# Patient Record
Sex: MALE | Race: WHITE
[De-identification: names, ages, dates, MRNs, and addresses within clinical notes are randomized per-mention and may not be internally consistent; named-entity substitution may affect disease eponyms.]

---

## 2018-05-19 ENCOUNTER — HOSPITAL ENCOUNTER (EMERGENCY)
Dept: HOSPITAL 56 - MW.ED | Age: 43
Discharge: HOME | End: 2018-05-19
Payer: MEDICAID

## 2018-05-19 DIAGNOSIS — E78.1: ICD-10-CM

## 2018-05-19 DIAGNOSIS — Z79.899: ICD-10-CM

## 2018-05-19 DIAGNOSIS — R10.13: ICD-10-CM

## 2018-05-19 DIAGNOSIS — E78.5: ICD-10-CM

## 2018-05-19 DIAGNOSIS — Z90.49: ICD-10-CM

## 2018-05-19 DIAGNOSIS — E78.00: ICD-10-CM

## 2018-05-19 DIAGNOSIS — R10.12: Primary | ICD-10-CM

## 2018-05-19 LAB
CHLORIDE SERPL-SCNC: 105 MMOL/L (ref 98–107)
SODIUM SERPL-SCNC: 139 MMOL/L (ref 136–148)

## 2018-05-19 PROCEDURE — 80053 COMPREHEN METABOLIC PANEL: CPT

## 2018-05-19 PROCEDURE — 96360 HYDRATION IV INFUSION INIT: CPT

## 2018-05-19 PROCEDURE — 83690 ASSAY OF LIPASE: CPT

## 2018-05-19 PROCEDURE — 80061 LIPID PANEL: CPT

## 2018-05-19 PROCEDURE — 36415 COLL VENOUS BLD VENIPUNCTURE: CPT

## 2018-05-19 PROCEDURE — 82150 ASSAY OF AMYLASE: CPT

## 2018-05-19 PROCEDURE — 99284 EMERGENCY DEPT VISIT MOD MDM: CPT

## 2018-05-19 PROCEDURE — 85025 COMPLETE CBC W/AUTO DIFF WBC: CPT

## 2018-05-19 PROCEDURE — 74177 CT ABD & PELVIS W/CONTRAST: CPT

## 2018-05-21 NOTE — CT
EXAM DATE: 18



PATIENT'S AGE: 42





Patient: BRYANT GOODRICH



Facility: [place], [place]

Patient ID: [id number]

Site Patient ID: [id number].

Site Accession #: [id number]

: 1975

Study: CT Abdomen/Pelvis [id number]/19/2018 11:04:17 AM

Ordering Physician: Cuba Moura



Final Report: 

INDICATION:

Abdominal pain. Past history pancreatitis.



COMPARISON:

None.



TECHNIQUE:

100 mL Isovue-370 IV contrast with portal venous imaging.



FINDINGS:

No significant finding in the visualized lung bases. Liver and spleen are 
normal. Gallbladder is absent presumably surgically. No ductal dilatation. 
Adrenals are normal. No pancreatic inflammation, ductal dilatation or mass. No 
dilatation or inflammation of large or small bowel. Normal retrocecal appendix. 
Abdominal wall is intact. No fluid or air in the peritoneum. Kidneys enhance 
normally. No definite nephrolithiasis. Some early excretion of contrast in the 
collecting system bilaterally. Aorta is non aneurysmal. No pathologic 
adenopathy. No bone finding of significance. 



IMPRESSION:

No source for abdominal pain identified.



Please note that all CT scans at this facility use dose modulation, iterative 
reconstruction, and/or weight-based dosing when appropriate to reduce radiation 
dose to as low as reasonably achievable.



Dictated by Santos Alcantar MD @ May 19 2018 11:31AM

(Electronic Signature)





Report Signed by Proxy.
[facility]

## 2018-08-14 ENCOUNTER — HOSPITAL ENCOUNTER (INPATIENT)
Dept: HOSPITAL 56 - MW.ED | Age: 43
LOS: 3 days | Discharge: HOME | DRG: 440 | End: 2018-08-17
Attending: INTERNAL MEDICINE | Admitting: INTERNAL MEDICINE
Payer: COMMERCIAL

## 2018-08-14 DIAGNOSIS — Z87.891: ICD-10-CM

## 2018-08-14 DIAGNOSIS — K85.90: Primary | ICD-10-CM

## 2018-08-14 DIAGNOSIS — Z79.899: ICD-10-CM

## 2018-08-14 DIAGNOSIS — E78.1: ICD-10-CM

## 2018-08-14 DIAGNOSIS — E78.01: ICD-10-CM

## 2018-08-14 LAB
CHLORIDE SERPL-SCNC: 101 MMOL/L (ref 98–107)
SODIUM SERPL-SCNC: 139 MMOL/L (ref 136–148)

## 2018-08-14 PROCEDURE — G0378 HOSPITAL OBSERVATION PER HR: HCPCS

## 2018-08-14 NOTE — CT
EXAM DATE: 18



PATIENT'S AGE: 43



Patient: BRYANT GOODRICH



Facility: [place], [place]

Patient ID: [id number]

Site Patient ID: [id number].

Site Accession #: [id number]

: 1975

Study: CT Abdomen/Pelvis w/ cont [id number]/14/2018 4:26:13 AM

Ordering Physician: Cuba Moura



Final Report: 

INDICATION:

History of pancreatitis 



TECHNIQUE:

CT abdomen and pelvis acquired with IV contrast. 100 cc Isovue 370 



COMPARISON:

2018. 



FINDINGS:

Lower chest: Unremarkable. 

Liver: Unremarkable. 

Spleen: Unremarkable. 

Pancreas: Unremarkable. 

Gallbladder and bile ducts: The gallbladder is absent. 

Kidneys: Unremarkable. 

Adrenal glands: Unremarkable. 

GI tract: Unremarkable. Appendix is normal. 

Vascular structures: Unremarkable. 

Lymph nodes: Unremarkable. 

Miscellaneous: Stable proximal mesenteric fat stranding. No free air or 
significant free fluid. 

Pelvic Organs: Unremarkable. 

Bones: Unremarkable for age. 



IMPRESSION:

No CT evidence for pancreatitis. No definitive findings to explain patient`s 
symptoms. 

Stable proximal mesenteric fat stranding. 

The gallbladder is absent.



Dictated by Robert Dasilva MD @ 2018 5:03:33 AM



Please note that all CT scans at this facility use dose modulation, iterative 
reconstruction, and/or weight-based dosing when appropriate to reduce radiation 
dose to as low as reasonably achievable.



Dictated by: Robert Dasilva MD @ 2018 05:03:47

(Electronic Signature)







Report Signed by Proxy.
[facility]

## 2018-08-14 NOTE — PCM.HP
H&P History of Present Illness





- General


Date of Service: 08/14/18


Admit Problem/Dx: 


 Admission Diagnosis/Problem





Admission Diagnosis/Problem      Abdominal pain in male











- History of Present Illness


Initial Comments - Free Text/Narative: 





44 yo male with pmh of recurrent triglyceride induced pancreatitis and familial 

hyperchloresterolemia who presents with several day history of epigastric pain.

  He denies any fevers, blood in stool or shortness of breath.


  ** upper abdomen


Pain Score (Numeric/FACES): 4





- Related Data


Allergies/Adverse Reactions: 


 Allergies











Allergy/AdvReac Type Severity Reaction Status Date / Time


 


No Known Allergies Allergy   Verified 08/14/18 00:46











Home Medications: 


 Home Meds





Fish Oil/Omega-3 Fatty Acids [Fish Oil 1,000 MG] 2 gm PO BID #60 cap 08/17/18 [

Rx]


Gemfibrozil [Lopid] 600 mg PO BID 30 Days #60 tablet 08/17/18 [Rx]











Past Medical History


Cardiovascular History: Reports: High Cholesterol


Other Cardiovascular History: high triglycerides


Gastrointestinal History: Reports: Pancreatitis


Other Gastrointestinal History: pancreatitis x9; has a familial hyperlipidemia 

which he states it is linked to the pancreatitis





- Infectious Disease History


Infectious Disease History: Reports: Chicken Pox





- Past Surgical History


GI Surgical History: Reports: Cholecystectomy





Social & Family History





- Family History


Family Medical History: Noncontributory





- Tobacco Use


Smoking Status *Q: Former Smoker


Years of Tobacco use: 15


Used Tobacco, but Quit: Yes


Month/Year Tobacco Last Used: 6 momths ago





- Caffeine Use


Caffeine Use: Reports: Coffee





- Recreational Drug Use


Recreational Drug Use: No





H&P Review of Systems





- Review of Systems:


Review Of Systems: See Below





Exam





- Exam


Exam: See Below





- Vital Signs


Vital Signs: 


 Last Vital Signs











Temp  36.4 C   08/14/18 11:38


 


Pulse  83   08/14/18 11:38


 


Resp  18   08/14/18 11:38


 


BP  127/78   08/14/18 11:38


 


Pulse Ox  97   08/14/18 11:38











Weight: 94.075 kg





- Exam


General: Alert, Oriented


HEENT: Mucosa Moist & Pink


Neck: Supple


Lungs: Clear to Auscultation, Normal Respiratory Effort


Cardiovascular: Regular Rate, Regular Rhythm


GI/Abdominal Exam: Normal Bowel Sounds, Soft, Non-Tender


Skin: Warm, Dry, Intact





- Patient Data


Lab Results Last 24 hrs: 


 Laboratory Results - last 24 hr











  08/14/18 08/14/18 08/14/18 Range/Units





  01:15 01:15 02:00 


 


WBC  6.18    (4.0-11.0)  K/uL


 


RBC  4.33 L    (4.50-5.90)  M/uL


 


Hgb  NP    


 


Hct  37.8 L    (38.0-50.0)  %


 


RDW Std Deviation  44.9    (28.0-62.0)  fl


 


RDW Coeff of Dajuan  14    (11.0-15.0)  %


 


Plt Count  202    (150-400)  K/uL


 


MPV  10.20    (7.40-12.00)  fL


 


Add Manual Diff  YES    


 


Neutrophils % (Manual)  64    (48.0-80.0)  %


 


Band Neutrophils %  1    %


 


Lymphocytes % (Manual)  21    (16.0-40.0)  %


 


Monocytes % (Manual)  9    (0.0-15.0)  %


 


Eosinophils % (Manual)  4    (0.0-7.0)  %


 


Metamyelocytes %  1    %


 


Nucleated RBC %  1.1    /100WBC


 


Absolute Seg Neuts  4.0    (1.4-5.7)  


 


Band Neutrophils #  0.1    


 


Lymphocytes # (Manual)  1.3    (0.6-2.4)  


 


Monocytes # (Manual)  0.6    (0.0-0.8)  


 


Eosinophils # (Manual)  0.2    (0.0-0.7)  


 


Absolute Metamyelocyte  0.1    


 


Nucleated RBCs #  0    K/uL


 


Sodium   139   (136-148)  mmol/L


 


Potassium   4.2   (3.5-5.1)  mmol/L


 


Chloride   101   ()  mmol/L


 


Carbon Dioxide   24.8   (21.0-32.0)  mmol/L


 


BUN   19 H   (7.0-18.0)  mg/dL


 


Creatinine   1.2   (0.8-1.3)  mg/dL


 


Est Cr Clr Drug Dosing   70.15   mL/min


 


Estimated GFR (MDRD)   > 60.0   ml/min


 


Glucose   124 H   ()  mg/dL


 


Calcium   8.9   (8.5-10.1)  mg/dL


 


Total Bilirubin   0.4   (0.2-1.0)  mg/dL


 


Alkaline Phosphatase   66   ()  U/L


 


Total Protein   7.5   (6.4-8.2)  g/dL


 


Albumin   NP   


 


Triglycerides   1515 H   (0-200)  mg/dL


 


Cholesterol   276 H   ()  mg/dL


 


HDL Cholesterol   22 L   (40-60)  mg/dL


 


Cholesterol/HDL Ratio   12.5 H   (3.3-6.0)  


 


Lipase   1697 H   ()  U/L


 


Urine Color    YELLOW  


 


Urine Appearance    CLEAR  


 


Urine pH    6.0  (5.0-8.0)  


 


Ur Specific Gravity    1.025  (1.001-1.035)  


 


Urine Protein    NEGATIVE  (NEGATIVE)  mg/dL


 


Urine Glucose (UA)    NEGATIVE  (NEGATIVE)  mg/dL


 


Urine Ketones    NEGATIVE  (NEGATIVE)  mg/dL


 


Urine Occult Blood    TRACE-INTACT  (NEGATIVE)  


 


Urine Nitrite    NEGATIVE  (NEGATIVE)  


 


Urine Bilirubin    NEGATIVE  (NEGATIVE)  


 


Urine Urobilinogen    0.2  (<2.0)  EU/dL


 


Ur Leukocyte Esterase    NEGATIVE  (NEGATIVE)  


 


Urine RBC    0-1  (0-2/HPF)  


 


Urine WBC    1-3  (0-5/HPF)  


 


Ur Epithelial Cells    NOT SEEN  (NONE-FEW)  


 


Amorphous Sediment    FEW  (NEGATIVE)  


 


Urine Bacteria    RARE  (NEGATIVE)  


 


Urine Mucus    MODERATE  (NONE-MOD)  











Result Diagrams: 


 08/17/18 04:40





 08/17/18 04:40


Problem List Initiated/Reviewed/Updated: Yes


Orders Last 24hrs: 


 Active Orders 24 hr











 Category Date Time Status


 


 Admission Status [Patient Status] [ADT] Stat ADT  08/14/18 05:22 Active


 


 Accu Check [Blood Glucose Check, Bedside] [RC] Q1HR Care  08/14/18 12:38 

Ordered


 


 Oxygen Therapy [RC] PRN Care  08/14/18 12:38 Ordered


 


 Up ad Radha [RC] ASDIRECTED Care  08/14/18 12:38 Ordered


 


 VTE/DVT Education [RC] PER UNIT ROUTINE Care  08/14/18 12:38 Ordered


 


 Vital Signs [RC] Q4H Care  08/14/18 12:38 Ordered


 


 NPO Now [Nothing per Oral Now Diet] [DIET] Diet  08/14/18 Breakfast Active


 


 Abdomen Pelvis w Cont [CT] Stat Exams  08/14/18 01:00 Taken


 


 BASIC METABOLIC PANEL,BMP [CHEM] AM Lab  08/15/18 05:11 Ordered


 


 CBC WITH AUTO DIFF [HEME] AM Lab  08/15/18 05:11 Ordered


 


 UA W/MICROSCOPIC [URIN] Stat Lab  08/14/18 02:00 Ordered


 


 Dextrose 5%-Normal Saline @ 150 MLS/HR(1000ml) Med  08/14/18 12:45 Ordered





 Dextrose 5%-0.9% NaCl [Dextrose 5%-Normal Saline] 1,000   





 ml   





 IV ASDIRECTED   


 


 HYDROmorphone [Dilaudid] Med  08/14/18 06:32 Active





 1 mg IVPUSH Q2H PRN   


 


 Insulin Regular, Human [NovoLIN R] 100 unit Med  08/14/18 12:45 Ordered





 Sodium Chloride 0.9% [Normal Saline] 99 ml   





 IV TITRATE   


 


 Ondansetron [Zofran] Med  08/14/18 06:32 Active





 4 mg IVPUSH Q4H PRN   


 


 Sodium Chloride 0.9% [Saline Flush] Med  08/14/18 01:00 Active





 10 ml FLUSH ASDIRECTED PRN   


 


 Sodium Chloride 0.9% [Saline Flush] Med  08/14/18 01:00 Active





 2.5 ml FLUSH ASDIRECTED PRN   


 


 Sodium Chloride 0.9% [Saline Flush] Med  08/14/18 01:00 Active





 2.5 ml FLUSH ASDIRECTED PRN   


 


 Saline Lock Insert [OM.PC] Stat Oth  08/14/18 01:00 Ordered


 


 Sequential Compression Device [OM.PC] Per Unit Routine Oth  08/14/18 12:39 

Ordered


 


 Resuscitation Status Routine Resus Stat  08/14/18 12:38 Ordered








 Medication Orders





Hydromorphone HCl (Dilaudid)  1 mg IVPUSH Q2H PRN


   PRN Reason: Pain


   Last Admin: 08/14/18 11:21  Dose: 1 mg


   Admin: 08/14/18 06:57  Dose: 1 mg


Insulin Human Regular 100 unit (/ Sodium Chloride)  100 mls @ 1 mls/hr IV 

TITRATE DAVIE; Protocol


Dextrose/Sodium Chloride (Dextrose 5%-Normal Saline)  1,000 mls @ 150 mls/hr IV 

ASDIRECTED DAVIE


Ondansetron HCl (Zofran)  4 mg IVPUSH Q4H PRN


   PRN Reason: Nausea/Vomiting


Sodium Chloride (Saline Flush)  2.5 ml FLUSH ASDIRECTED PRN


   PRN Reason: Keep Vein Open


   Last Admin: 08/14/18 01:16  Dose: 2.5 ml


Sodium Chloride (Saline Flush)  10 ml FLUSH ASDIRECTED PRN


   PRN Reason: Keep Vein Open


   Last Admin: 08/14/18 01:15  Dose: 10 ml


Sodium Chloride (Saline Flush)  2.5 ml FLUSH ASDIRECTED PRN


   PRN Reason: Keep Vein Open








Assessment/Plan Comment:: 





44 yo male with hypertriglyeridemia induced acute pancreatitis.  We will 

rescucitate with IV fluids, dialaudid prn for pain control and bowel rest.  We 

will try insulin drip to reduce his triglyceride levels.

## 2018-08-14 NOTE — EDM.PDOC
ED HPI GENERAL MEDICAL PROBLEM





- General


Chief Complaint: Abdominal Pain


Stated Complaint: UPPER STOMACH PAIN


Time Seen by Provider: 08/14/18 00:28


Source of Information: Reports: Patient


History Limitations: Reports: No Limitations





- History of Present Illness


INITIAL COMMENTS - FREE TEXT/NARRATIVE: 





HISTORY AND PHYSICAL:





History of present illness:


43-year-old male presenting emergency department with mid epigastric abdominal 

pain 1 day with past medical history of familial hypercholesterolemia and 

chronic pancreatitis.





I have seen this patient before and discussed with him about his condition 3 

months ago. He has not seen a provider since I saw him 3 months ago for similar 

symptoms. States that this evening he after dinner approximately 2 hours began 

having mid abdominal pain that was similar to his chronic pancreatitis and that 

he had before. States he tried to lay down but it seemed to be worse. Came to 

the emergency department for further evaluation. Pain is constant and sharp 

with radiation to the back. Denies any fever, chills, nausea, vomiting, diarrhea

, bloody stool, dark tarry stools, chest pain, palpitations, shortness of breath

, syncopal episodes, focal neurologic episodes. History of familial HLD.  Last 

CT 3 months ago was negative but Triglycerides elevated. He is not consuming 

alcohol.








On exam mid and epigastric abd pain on palp.  No rebound or rigidity, normal bs

, 





Review of systems: 


As per history of present illness and below otherwise all systems reviewed and 

negative.





Past medical history: 


As per history of present illness and as reviewed below otherwise 

noncontributory.





Surgical history: 


As per history of present illness and as reviewed below otherwise 

noncontributory.





Social history: 


No reported history of drug or alcohol abuse.





Family history: 


As per history of present illness and as reviewed below otherwise 

noncontributory.





Physical exam:


HEENT: Atraumatic, normocephalic, pupils reactive, negative for conjunctival 

pallor or scleral icterus, mucous membranes moist, throat clear, neck supple, 

nontender, trachea midline.


Lungs: Clear to auscultation, breath sounds equal bilaterally, chest nontender.


Heart: S1S2, regular, negative for clicks, rubs, or JVD.


Abdomen: See above nondistended, . Negative for masses or hepatosplenomegaly. 

Negative for costovertebral tenderness.


Pelvis: Stable nontender.


Genitourinary: Deferred.


Rectal: Deferred.


Extremities: Atraumatic, negative for cords or calf pain. Neurovascular 

unremarkable.


Neuro: Awake, alert, oriented. Cranial nerves II through XII unremarkable. 

Cerebellum unremarkable. Motor and sensory unremarkable throughout. Exam 

nonfocal.





Diagnostics:


CBC, CMP, lipid panel, UA, abdominal CT with contrast





Therapeutics:


1 L normal saline, 30 mg IV Toradol, 1 mg IV Dilaudid





Impression: 


Familial hypercholesterolemia


Chylomicronemia syndrome


Chronic pancreatitis





Plan:


Did talk with Dr. Brothers, hospitalist that agreed with admission.  Patient TGL 

was 1515 with HDL of 22, Chol 276, and LIPL 1697, CT abdomin unremarkable.  

Most likely Chylomicronemia syndrome with hx of Familial hypercholesterolemia.  








Definitive disposition and diagnosis as appropriate pending reevaluation and 

review of above.








  ** upper abdomen


Pain Score (Numeric/FACES): 10





- Related Data


 Allergies











Allergy/AdvReac Type Severity Reaction Status Date / Time


 


No Known Allergies Allergy   Verified 08/14/18 00:46











Home Meds: 


 Home Meds





Gemfibrozil [Lopid] 600 mg PO BID 05/19/18 [History]











Past Medical History


Cardiovascular History: Reports: High Cholesterol


Other Cardiovascular History: high triglycerides


Gastrointestinal History: Reports: Pancreatitis


Other Gastrointestinal History: pancreatitis x9; has a familial hyperlipidemia 

which he states it is linked to the pancreatitis





- Infectious Disease History


Infectious Disease History: Reports: Chicken Pox





- Past Surgical History


GI Surgical History: Reports: Cholecystectomy





Social & Family History





- Family History


Family Medical History: Noncontributory





- Caffeine Use


Caffeine Use: Reports: Coffee





ED ROS GENERAL





- Review of Systems


Review Of Systems: ROS reveals no pertinent complaints other than HPI.





ED EXAM, GENERAL





- Physical Exam


Exam: See Below





Course





- Vital Signs


Last Recorded V/S: 


 Last Vital Signs











Temp  97.8 F   08/14/18 00:20


 


Pulse  99   08/14/18 00:20


 


Resp  18   08/14/18 00:20


 


BP  159/93 H  08/14/18 00:20


 


Pulse Ox  94 L  08/14/18 00:20














- Orders/Labs/Meds


Orders: 


 Active Orders 24 hr











 Category Date Time Status


 


 Abdomen Pelvis w Cont [CT] Stat Exams  08/14/18 01:00 Taken


 


 CBC WITH AUTO DIFF [HEME] Stat Lab  08/14/18 01:15 Received


 


 COMPREHENSIVE METABOLIC PN,CMP [CHEM] Stat Lab  08/14/18 01:15 Received


 


 LIPASE [CHEM] Stat Lab  08/14/18 01:15 Received


 


 LIPID PANEL [CHEM] Stat Lab  08/14/18 01:15 Received


 


 UA W/MICROSCOPIC [URIN] Stat Lab  08/14/18 02:00 Ordered


 


 Sodium Chloride 0.9% [Normal Saline] 1,000 ml Med  08/14/18 04:44 Active





 IV STAT   


 


 Sodium Chloride 0.9% [Saline Flush] Med  08/14/18 01:00 Active





 10 ml FLUSH ASDIRECTED PRN   


 


 Sodium Chloride 0.9% [Saline Flush] Med  08/14/18 01:00 Active





 2.5 ml FLUSH ASDIRECTED PRN   


 


 Sodium Chloride 0.9% [Saline Flush] Med  08/14/18 01:00 Active





 2.5 ml FLUSH ASDIRECTED PRN   


 


 Saline Lock Insert [OM.PC] Stat Oth  08/14/18 01:00 Ordered








 Medication Orders





Sodium Chloride (Normal Saline)  1,000 mls @ 999 mls/hr IV STAT ONE


   Stop: 08/14/18 05:44


   Last Admin: 08/14/18 03:00  Dose: 999 mls/hr


Sodium Chloride (Saline Flush)  2.5 ml FLUSH ASDIRECTED PRN


   PRN Reason: Keep Vein Open


   Last Admin: 08/14/18 01:16  Dose: 2.5 ml


Sodium Chloride (Saline Flush)  10 ml FLUSH ASDIRECTED PRN


   PRN Reason: Keep Vein Open


   Last Admin: 08/14/18 01:15  Dose: 10 ml


Sodium Chloride (Saline Flush)  2.5 ml FLUSH ASDIRECTED PRN


   PRN Reason: Keep Vein Open








Labs: 


 Laboratory Tests











  08/14/18 Range/Units





  02:00 


 


Urine Color  YELLOW  


 


Urine Appearance  CLEAR  


 


Urine pH  6.0  (5.0-8.0)  


 


Ur Specific Gravity  1.025  (1.001-1.035)  


 


Urine Protein  NEGATIVE  (NEGATIVE)  mg/dL


 


Urine Glucose (UA)  NEGATIVE  (NEGATIVE)  mg/dL


 


Urine Ketones  NEGATIVE  (NEGATIVE)  mg/dL


 


Urine Occult Blood  TRACE-INTACT  (NEGATIVE)  


 


Urine Nitrite  NEGATIVE  (NEGATIVE)  


 


Urine Bilirubin  NEGATIVE  (NEGATIVE)  


 


Urine Urobilinogen  0.2  (<2.0)  EU/dL


 


Ur Leukocyte Esterase  NEGATIVE  (NEGATIVE)  


 


Urine RBC  0-1  (0-2/HPF)  


 


Urine WBC  1-3  (0-5/HPF)  


 


Ur Epithelial Cells  NOT SEEN  (NONE-FEW)  


 


Amorphous Sediment  FEW  (NEGATIVE)  


 


Urine Bacteria  RARE  (NEGATIVE)  


 


Urine Mucus  MODERATE  (NONE-MOD)  











Meds: 


Medications











Generic Name Dose Route Start Last Admin





  Trade Name Freq  PRN Reason Stop Dose Admin


 


Sodium Chloride  1,000 mls @ 999 mls/hr  08/14/18 04:44  08/14/18 03:00





  Normal Saline  IV  08/14/18 05:44  999 mls/hr





  STAT ONE   Administration





     





     





     





     


 


Sodium Chloride  2.5 ml  08/14/18 01:00  08/14/18 01:16





  Saline Flush  FLUSH   2.5 ml





  ASDIRECTED PRN   Administration





  Keep Vein Open   





     





     





     


 


Sodium Chloride  10 ml  08/14/18 01:00  08/14/18 01:15





  Saline Flush  FLUSH   10 ml





  ASDIRECTED PRN   Administration





  Keep Vein Open   





     





     





     


 


Sodium Chloride  2.5 ml  08/14/18 01:00  





  Saline Flush  FLUSH   





  ASDIRECTED PRN   





  Keep Vein Open   





     





     





     














Discontinued Medications














Generic Name Dose Route Start Last Admin





  Trade Name Freq  PRN Reason Stop Dose Admin


 


Hydromorphone HCl  1 mg  08/14/18 01:45  08/14/18 02:00





  Dilaudid  IVPUSH  08/14/18 01:46  1 mg





  ONETIME ONE   Administration





     





     





     





     


 


Hydromorphone HCl  Confirm  08/14/18 04:13  08/14/18 04:45





  Dilaudid  Administered  08/14/18 04:14  Not Given





  Dose   





  1 mg   





  .ROUTE   





  .STK-MED ONE   





     





     





     





     


 


Hydromorphone HCl  1 mg  08/14/18 04:44  08/14/18 04:14





  Dilaudid  IVPUSH  08/14/18 04:45  1 mg





  ONETIME ONE   Administration





     





     





     





     


 


Sodium Chloride  1,000 mls @ 999 mls/hr  08/14/18 01:00  08/14/18 01:31





  Normal Saline  IV  08/14/18 02:00  999 mls/hr





  BOLUS ONE   Administration





     





     





     





     


 


Iopamidol  100 ml  08/14/18 04:41  08/14/18 04:43





  Isovue Multipack-370 (76%)  IVPUSH  08/14/18 04:42  100 ml





  ONETIME ONE   Administration





     





     





     





     


 


Ketorolac Tromethamine  30 mg  08/14/18 01:00  08/14/18 01:35





  Toradol  IVPUSH  08/14/18 01:01  30 mg





  ONETIME ONE   Administration





     





     





     





     


 


Ondansetron HCl  4 mg  08/14/18 01:00  08/14/18 01:33





  Zofran  IVPUSH  08/14/18 01:01  4 mg





  ONETIME ONE   Administration





     





     





     





     














Departure





- Departure


Time of Disposition: 05:21


Disposition: Admitted As Inpatient 64


Condition: Fair


Clinical Impression: 


 Abdominal pain in male, Chylomicronemia syndrome, Familial hypercholesterolemia








- Discharge Information


Referrals: 


PCP,None [Primary Care Provider] - 


Forms:  ED Department Discharge





- My Orders


Last 24 Hours: 


My Active Orders





08/14/18 01:00


Abdomen Pelvis w Cont [CT] Stat 


Sodium Chloride 0.9% [Saline Flush]   10 ml FLUSH ASDIRECTED PRN 


Sodium Chloride 0.9% [Saline Flush]   2.5 ml FLUSH ASDIRECTED PRN 


Sodium Chloride 0.9% [Saline Flush]   2.5 ml FLUSH ASDIRECTED PRN 


Saline Lock Insert [OM.PC] Stat 





08/14/18 01:15


CBC WITH AUTO DIFF [HEME] Stat 


COMPREHENSIVE METABOLIC PN,CMP [CHEM] Stat 


LIPASE [CHEM] Stat 


LIPID PANEL [CHEM] Stat 





08/14/18 02:00


UA W/MICROSCOPIC [URIN] Stat 





08/14/18 04:44


Sodium Chloride 0.9% [Normal Saline] 1,000 ml IV STAT 














- Assessment/Plan


Last 24 Hours: 


My Active Orders





08/14/18 01:00


Abdomen Pelvis w Cont [CT] Stat 


Sodium Chloride 0.9% [Saline Flush]   10 ml FLUSH ASDIRECTED PRN 


Sodium Chloride 0.9% [Saline Flush]   2.5 ml FLUSH ASDIRECTED PRN 


Sodium Chloride 0.9% [Saline Flush]   2.5 ml FLUSH ASDIRECTED PRN 


Saline Lock Insert [OM.PC] Stat 





08/14/18 01:15


CBC WITH AUTO DIFF [HEME] Stat 


COMPREHENSIVE METABOLIC PN,CMP [CHEM] Stat 


LIPASE [CHEM] Stat 


LIPID PANEL [CHEM] Stat 





08/14/18 02:00


UA W/MICROSCOPIC [URIN] Stat 





08/14/18 04:44


Sodium Chloride 0.9% [Normal Saline] 1,000 ml IV STAT

## 2018-08-15 LAB
CHLORIDE SERPL-SCNC: 103 MMOL/L (ref 98–107)
SODIUM SERPL-SCNC: 136 MMOL/L (ref 136–148)

## 2018-08-16 LAB
CHLORIDE SERPL-SCNC: 102 MMOL/L (ref 98–107)
SODIUM SERPL-SCNC: 137 MMOL/L (ref 136–148)

## 2018-08-16 RX ADMIN — OMEGA-3 FATTY ACIDS CAP 1000 MG SCH GM: 1000 CAP at 21:39

## 2018-08-16 RX ADMIN — OMEGA-3 FATTY ACIDS CAP 1000 MG SCH GM: 1000 CAP at 10:30

## 2018-08-16 NOTE — PCM.PN
- General Info


Date of Service: 18


Admission Dx/Problem (Free Text): 


 Patient reports improved abdominal pain . His  was advanced as tolerated. 

TG in the morning in 2700 . Patient restarted  on insulin drip with D5 Ns iv 

and he was also restarted on Gemfibrozil and fish oil








- Review of Systems


General: Reports: No Symptoms


HEENT: Reports: No Symptoms


Pulmonary: Reports: No Symptoms


Cardiovascular: Reports: No Symptoms


Gastrointestinal: Reports: Abdominal Pain


Genitourinary: Reports: No Symptoms


Musculoskeletal: Reports: No Symptoms


Skin: Reports: Other (xantelasma)


Neurological: Reports: No Symptoms


Psychiatric: Reports: No Symptoms





- Patient Data


Vitals - Most Recent: 


 Last Vital Signs











Temp  97.3 F   18 16:00


 


Pulse  78   18 16:00


 


Resp  22 H  18 16:00


 


BP  125/62   18 16:00


 


Pulse Ox  96   18 16:00











Weight - Most Recent: 207 lb 6.4 oz


I&O - Last 24 Hours: 


 Intake & Output











 18





 06:59 14:59 22:59


 


Intake Total 1630 1315 3110


 


Output Total 750  2135


 


Balance 880 1315 975











Lab Results Last 24 Hours: 


 Laboratory Results - last 24 hr











  18 Range/Units





  05:55 05:55 05:55 


 


WBC  3.40 L    (4.0-11.0)  K/uL


 


RBC  4.13 L    (4.50-5.90)  M/uL


 


Hgb  13.2    (13.0-17.0)  g/dL


 


Hct  36.2 L    (38.0-50.0)  %


 


MCV  87.7    (80.0-98.0)  fL


 


MCH  32.0    (27.0-32.0)  pg


 


MCHC  36.5    (31.0-37.0)  g/dL


 


RDW Std Deviation  44.7    (28.0-62.0)  fl


 


RDW Coeff of Dajuan  14    (11.0-15.0)  %


 


Plt Count  144 L    (150-400)  K/uL


 


MPV  10.20    (7.40-12.00)  fL


 


Neut % (Auto)  50.9    (48.0-80.0)  %


 


Lymph % (Auto)  34.7    (16.0-40.0)  %


 


Mono % (Auto)  7.6    (0.0-15.0)  %


 


Eos % (Auto)  6.5    (0.0-7.0)  %


 


Baso % (Auto)  0.3    (0.0-1.5)  %


 


Neut # (Auto)  1.7    (1.4-5.7)  K/uL


 


Lymph # (Auto)  1.2    (0.6-2.4)  K/uL


 


Mono # (Auto)  0.3    (0.0-0.8)  K/uL


 


Eos # (Auto)  0.2    (0.0-0.7)  K/uL


 


Baso # (Auto)  0.0    (0.0-0.1)  K/uL


 


Nucleated RBC %  0.0    /100WBC


 


Nucleated RBCs #  0    K/uL


 


Sodium   137   (136-148)  mmol/L


 


Potassium   3.9   (3.5-5.1)  mmol/L


 


Chloride   102   ()  mmol/L


 


Carbon Dioxide   27.9   (21.0-32.0)  mmol/L


 


BUN   6 L   (7.0-18.0)  mg/dL


 


Creatinine   0.9   (0.8-1.3)  mg/dL


 


Est Cr Clr Drug Dosing   93.53   mL/min


 


Estimated GFR (MDRD)   > 60.0   ml/min


 


Glucose   111 H   ()  mg/dL


 


POC Glucose     ()  mg/dL


 


Calcium   7.8 L   (8.5-10.1)  mg/dL


 


Triglycerides    2755 H  (0-200)  mg/dL














  18 Range/Units





  11:26 12:54 14:03 


 


WBC     (4.0-11.0)  K/uL


 


RBC     (4.50-5.90)  M/uL


 


Hgb     (13.0-17.0)  g/dL


 


Hct     (38.0-50.0)  %


 


MCV     (80.0-98.0)  fL


 


MCH     (27.0-32.0)  pg


 


MCHC     (31.0-37.0)  g/dL


 


RDW Std Deviation     (28.0-62.0)  fl


 


RDW Coeff of Dajuan     (11.0-15.0)  %


 


Plt Count     (150-400)  K/uL


 


MPV     (7.40-12.00)  fL


 


Neut % (Auto)     (48.0-80.0)  %


 


Lymph % (Auto)     (16.0-40.0)  %


 


Mono % (Auto)     (0.0-15.0)  %


 


Eos % (Auto)     (0.0-7.0)  %


 


Baso % (Auto)     (0.0-1.5)  %


 


Neut # (Auto)     (1.4-5.7)  K/uL


 


Lymph # (Auto)     (0.6-2.4)  K/uL


 


Mono # (Auto)     (0.0-0.8)  K/uL


 


Eos # (Auto)     (0.0-0.7)  K/uL


 


Baso # (Auto)     (0.0-0.1)  K/uL


 


Nucleated RBC %     /100WBC


 


Nucleated RBCs #     K/uL


 


Sodium     (136-148)  mmol/L


 


Potassium     (3.5-5.1)  mmol/L


 


Chloride     ()  mmol/L


 


Carbon Dioxide     (21.0-32.0)  mmol/L


 


BUN     (7.0-18.0)  mg/dL


 


Creatinine     (0.8-1.3)  mg/dL


 


Est Cr Clr Drug Dosing     mL/min


 


Estimated GFR (MDRD)     ml/min


 


Glucose     ()  mg/dL


 


POC Glucose  87  67  145 H  ()  mg/dL


 


Calcium     (8.5-10.1)  mg/dL


 


Triglycerides     (0-200)  mg/dL














  18 Range/Units





  14:58 


 


WBC   (4.0-11.0)  K/uL


 


RBC   (4.50-5.90)  M/uL


 


Hgb   (13.0-17.0)  g/dL


 


Hct   (38.0-50.0)  %


 


MCV   (80.0-98.0)  fL


 


MCH   (27.0-32.0)  pg


 


MCHC   (31.0-37.0)  g/dL


 


RDW Std Deviation   (28.0-62.0)  fl


 


RDW Coeff of Dajuan   (11.0-15.0)  %


 


Plt Count   (150-400)  K/uL


 


MPV   (7.40-12.00)  fL


 


Neut % (Auto)   (48.0-80.0)  %


 


Lymph % (Auto)   (16.0-40.0)  %


 


Mono % (Auto)   (0.0-15.0)  %


 


Eos % (Auto)   (0.0-7.0)  %


 


Baso % (Auto)   (0.0-1.5)  %


 


Neut # (Auto)   (1.4-5.7)  K/uL


 


Lymph # (Auto)   (0.6-2.4)  K/uL


 


Mono # (Auto)   (0.0-0.8)  K/uL


 


Eos # (Auto)   (0.0-0.7)  K/uL


 


Baso # (Auto)   (0.0-0.1)  K/uL


 


Nucleated RBC %   /100WBC


 


Nucleated RBCs #   K/uL


 


Sodium   (136-148)  mmol/L


 


Potassium   (3.5-5.1)  mmol/L


 


Chloride   ()  mmol/L


 


Carbon Dioxide   (21.0-32.0)  mmol/L


 


BUN   (7.0-18.0)  mg/dL


 


Creatinine   (0.8-1.3)  mg/dL


 


Est Cr Clr Drug Dosing   mL/min


 


Estimated GFR (MDRD)   ml/min


 


Glucose   ()  mg/dL


 


POC Glucose  101  ()  mg/dL


 


Calcium   (8.5-10.1)  mg/dL


 


Triglycerides   (0-200)  mg/dL











Med Orders - Current: 


 Current Medications





Acetaminophen (Tylenol)  650 mg PO Q6H PRN


   PRN Reason: Pain


Diphenhydramine HCl (Benadryl)  25 mg PO Q6H PRN


   PRN Reason: Itching


   Last Admin: 18 08:47 Dose:  25 mg


Fish Oil (Fish Oil)  2 gm PO BID [facility]


   Last Admin: 18 10:30 Dose:  2 gm


Gemfibrozil (Lopid)  600 mg PO BID [facility]


   Last Admin: 18 13:09 Dose:  600 mg


Hydromorphone HCl (Dilaudid)  1 mg IVPUSH Q2H PRN


   PRN Reason: Pain


   Last Admin: 18 15:22 Dose:  1 mg


Dextrose/Sodium Chloride (Dextrose 5%-Normal Saline)  1,000 mls @ 150 mls/hr IV 

ASDIRECTED [facility]


   Last Admin: 18 11:49 Dose:  150 mls/hr


Insulin Human Regular 100 unit (/ Sodium Chloride)  100 mls @ 1 mls/hr IV 

TITRATE DAVIE; Protocol


   Last Titration: 18 15:15 Dose:  0.5 unit/hr, 0.5 mls/hr


Ondansetron HCl (Zofran)  4 mg IVPUSH Q4H PRN


   PRN Reason: Nausea/Vomiting


Oxycodone/Acetaminophen (Percocet 325-5 Mg)  1 tab PO Q6H PRN


   PRN Reason: Pain


Sodium Chloride (Saline Flush)  2.5 ml FLUSH ASDIRECTED PRN


   PRN Reason: Keep Vein Open


   Last Admin: 18 01:16 Dose:  2.5 ml


Sodium Chloride (Saline Flush)  10 ml FLUSH ASDIRECTED PRN


   PRN Reason: Keep Vein Open


   Last Admin: 18 01:15 Dose:  10 ml


Sodium Chloride (Saline Flush)  2.5 ml FLUSH ASDIRECTED PRN


   PRN Reason: Keep Vein Open





Discontinued Medications





Hydromorphone HCl (Dilaudid)  1 mg IVPUSH ONETIME ONE


   Stop: 18 01:46


   Last Admin: 18 02:00 Dose:  1 mg


Hydromorphone HCl (Dilaudid) Confirm Administered Dose 1 mg .ROUTE .STK-MED ONE


   Stop: 18 04:14


   Last Admin: 18 04:45 Dose:  Not Given


Hydromorphone HCl (Dilaudid)  1 mg IVPUSH ONETIME ONE


   Stop: 18 04:45


   Last Admin: 18 04:14 Dose:  1 mg


Sodium Chloride (Normal Saline)  1,000 mls @ 999 mls/hr IV BOLUS ONE


   Stop: 18 02:00


   Last Admin: 18 01:31 Dose:  999 mls/hr


Sodium Chloride (Normal Saline)  1,000 mls @ 999 mls/hr IV STAT ONE


   Stop: 18 05:44


   Last Admin: 18 03:00 Dose:  999 mls/hr


Sodium Chloride (Normal Saline)  1,000 mls @ 200 mls/hr IV ASDIRECTED [facility]IE


   Last Admin: 18 11:41 Dose:  200 mls/hr


Insulin Human Regular 100 unit (/ Sodium Chloride)  100 mls @ 1 mls/hr IV 

TITRATE DAVIE; Protocol


   Last Titration: 08/15/18 07:57 Dose:  0 unit/hr, 0 mls/hr


Dextrose/Sodium Chloride (Dextrose 5%-Normal Saline)  1,000 mls @ 150 mls/hr IV 

ASDIRECTED [facility]


   Last Admin: 08/15/18 04:43 Dose:  150 mls/hr


Sodium Chloride (Normal Saline)  1,000 mls @ 150 mls/hr IV ASDIRECTED [facility]IE


   Last Admin: 18 08:56 Dose:  150 mls/hr


Iopamidol (Isovue Multipack-370 (76%))  100 ml IVPUSH ONETIME ONE


   Stop: 18 04:42


   Last Admin: 18 04:43 Dose:  100 ml


Ketorolac Tromethamine (Toradol)  30 mg IVPUSH ONETIME ONE


   Stop: 18 01:01


   Last Admin: 18 01:35 Dose:  30 mg


Ondansetron HCl (Zofran)  4 mg IVPUSH ONETIME ONE


   Stop: 18 01:01


   Last Admin: 18 01:33 Dose:  4 mg











- Exam


General: Alert, Oriented


HEENT: Pupils Equal


Neck: Supple, Trachea Midline


Lungs: Clear to Auscultation


Cardiovascular: Regular Rate, Regular Rhythm


GI/Abdominal Exam: Normal Bowel Sounds, Soft, No Distention, Tender (mild 

tenderness in epigastru)


Extremities: Normal Inspection


Skin: Warm, Dry, Other (xantelasma)


Neurological: No New Focal Deficit


Psy/Mental Status: Alert, Normal Affect





- Problem List & Annotations


(1) Familial hypercholesterolemia


SNOMED Code(s): 829443962


   Code(s): E78.01 - FAMILIAL HYPERCHOLESTEROLEMIA   Status: Acute   Current 

Visit: Yes   





(2) Hypertriglyceridemia, familial


SNOMED Code(s): 94288873


   Code(s): E78.1 - PURE HYPERGLYCERIDEMIA   Status: Acute   Current Visit: No 

  





(3) Acute pancreatitis


SNOMED Code(s): 958124375


   Code(s): K85.90 - ACUTE PANCREATITIS WITHOUT NECROSIS OR INFECTION, UNSP   

Status: Acute   Current Visit: Yes   





- Problem List Review


Problem List Initiated/Reviewed/Updated: Yes





- My Orders


Last 24 Hours: 


My Active Orders





18 09:30


Dextrose 5%-0.9% NaCl [Dextrose 5%-Normal Saline] 1,000 ml IV ASDIRECTED 


Fish Oil/Omega-3 Fatty Acids [Fish Oil]   2 gm PO BID 


Insulin Regular, Human [NovoLIN R] 100 unit   Sodium Chloride 0.9% [Normal 

Saline] 99 ml IV TITRATE 





18 10:55


Blood Glucose Check, Bedside [RC] Q4HR 





18 12:14


Consult to Dietician [CONS] Routine 





18 12:16


Patient Status [ADT] Routine 





18 12:45


Gemfibrozil [Lopid]   600 mg PO BID 





18 15:00


Communication Order [RC] ROUTINE 





18 16:24


Acetaminophen/oxyCODONE [Percocet 325-5 MG]   1 tab PO Q6H PRN 





18 16:25


Acetaminophen [Tylenol]   650 mg PO Q6H PRN 





18 20:00


TRIGLYCERIDES [CHEM] Routine 





18 Breakfast


Advance Diet Instructions [DIET] 





18 Dinner


Heart Healthy Diet [DIET] 














- Plan


Plan:: 





42 yo male with severe hypertriglyeridemia induced acute pancreatitis.  We will 

rescucitate with IV fluids, tramadol  prn for pain control , advance diet as 

tolerated . His pancreatitis is improving .  Will  restart  insulin drip to 

reduce his triglyceride levels, lovaza 2 grams po q 12 h , restart gemfibrozil 

600 mg po bid.


 F/up lipid level in am.

## 2018-08-17 LAB
CHLORIDE SERPL-SCNC: 102 MMOL/L (ref 98–107)
SODIUM SERPL-SCNC: 137 MMOL/L (ref 136–148)

## 2018-08-17 RX ADMIN — OMEGA-3 FATTY ACIDS CAP 1000 MG SCH GM: 1000 CAP at 10:20

## 2018-08-17 NOTE — PCM.[email or web address]
**Discharge Summary





- Hospital Course


Diagnosis: Stroke: No





- Discharge Data


Discharge Disposition: Home, Self-Care 01


Condition: Fair





- Discharge Diagnosis/Problem(s)


(1) Familial hypercholesterolemia


SNOMED Code(s): 313370796


   ICD Code: E78.01 - FAMILIAL HYPERCHOLESTEROLEMIA   Status: Acute   Current 

Visit: Yes   





(2) Hypertriglyceridemia, familial


SNOMED Code(s): 80645955


   ICD Code: E78.1 - PURE HYPERGLYCERIDEMIA   Status: Acute   Current Visit: No

   





(3) Acute pancreatitis


SNOMED Code(s): 514040820


   ICD Code: K85.90 - ACUTE PANCREATITIS WITHOUT NECROSIS OR INFECTION, UNSP   

Status: Acute   Current Visit: Yes   





- Patient Summary/Data


Consults: 


 Consultations





08/16/18 12:14


Consult to Dietician [CONS] Routine 














- Patient Instructions


Diet: Usual Diet as Tolerated


Diet, Other: low fat diet 


Activity: As Tolerated


Driving: [address] Today





- Discharge Plan


Prescriptions/Med Rec: 


Fish Oil/Omega-3 Fatty Acids [Fish Oil 1,000 MG] 2 gm PO BID #60 cap


Gemfibrozil [Lopid] 600 mg PO BID 30 Days #60 tablet


Home Medications: 


 Home Meds





Fish Oil/Omega-3 Fatty Acids [Fish Oil 1,000 MG] 2 gm PO BID #60 cap 08/17/18 [

Rx]


Gemfibrozil [Lopid] 600 mg PO BID 30 Days #60 tablet 08/17/18 [Rx]








Patient Handouts:  Dyslipidemia, High Triglycerides Eating Plan, Fish Oil, Omega

-3 Fatty Acids capsules (Rx), Gemfibrozil tablets


Referrals: 


[facility] [Outside]


Issac Pickens MD [Physician] - 09/12/18 9:30 am


Farzad Jerez MD [Physician] - 08/30/18 8:30 am





- Patient Data


Vitals - Most Recent: 


 Last Vital Signs











Temp  97.2 F   08/17/18 07:31


 


Pulse  77   08/17/18 07:31


 


Resp  16   08/17/18 07:31


 


BP  132/90   08/17/18 07:31


 


Pulse Ox  97   08/17/18 07:31











Weight - Most Recent: 207 lb 6.4 oz


I&O - Last 24 hours: 


 Intake & Output











 08/16/18 08/17/18 08/17/18





 22:59 06:59 14:59


 


Intake Total 3110 600 1194


 


Output Total 7206 700 


 


Balance 975 -100 1194











Lab Results - Last 24 hrs: 


 Laboratory Results - last 24 hr











  08/16/18 08/16/18 08/16/18 Range/Units





  12:54 14:03 14:58 


 


WBC     (4.0-11.0)  K/uL


 


RBC     (4.50-5.90)  M/uL


 


Hgb     (13.0-17.0)  g/dL


 


Hct     (38.0-50.0)  %


 


MCV     (80.0-98.0)  fL


 


MCH     (27.0-32.0)  pg


 


MCHC     (31.0-37.0)  g/dL


 


RDW Std Deviation     (28.0-62.0)  fl


 


RDW Coeff of Dajuan     (11.0-15.0)  %


 


Plt Count     (150-400)  K/uL


 


MPV     (7.40-12.00)  fL


 


Nucleated RBC %     /100WBC


 


Nucleated RBCs #     K/uL


 


Sodium     (136-148)  mmol/L


 


Potassium     (3.5-5.1)  mmol/L


 


Chloride     ()  mmol/L


 


Carbon Dioxide     (21.0-32.0)  mmol/L


 


BUN     (7.0-18.0)  mg/dL


 


Creatinine     (0.8-1.3)  mg/dL


 


Est Cr Clr Drug Dosing     mL/min


 


Estimated GFR (MDRD)     ml/min


 


Glucose     ()  mg/dL


 


POC Glucose  67  145 H  101  ()  mg/dL


 


Calcium     (8.5-10.1)  mg/dL


 


Total Bilirubin     (0.2-1.0)  mg/dL


 


AST     (15-37)  IU/L


 


ALT     (14-63)  IU/L


 


Alkaline Phosphatase     ()  U/L


 


Total Protein     (6.4-8.2)  g/dL


 


Albumin     (3.4-5.0)  g/dL


 


Globulin     (2.0-3.5)  g/dL


 


Albumin/Globulin Ratio     (1.3-2.8)  


 


Triglycerides     (0-200)  mg/dL


 


Cholesterol     ()  mg/dL


 


HDL Cholesterol     (40-60)  mg/dL


 


Cholesterol/HDL Ratio     (3.3-6.0)  














  08/16/18 08/16/18 08/17/18 Range/Units





  19:46 20:30 00:10 


 


WBC     (4.0-11.0)  K/uL


 


RBC     (4.50-5.90)  M/uL


 


Hgb     (13.0-17.0)  g/dL


 


Hct     (38.0-50.0)  %


 


MCV     (80.0-98.0)  fL


 


MCH     (27.0-32.0)  pg


 


MCHC     (31.0-37.0)  g/dL


 


RDW Std Deviation     (28.0-62.0)  fl


 


RDW Coeff of Dajuan     (11.0-15.0)  %


 


Plt Count     (150-400)  K/uL


 


MPV     (7.40-12.00)  fL


 


Nucleated RBC %     /100WBC


 


Nucleated RBCs #     K/uL


 


Sodium     (136-148)  mmol/L


 


Potassium     (3.5-5.1)  mmol/L


 


Chloride     ()  mmol/L


 


Carbon Dioxide     (21.0-32.0)  mmol/L


 


BUN     (7.0-18.0)  mg/dL


 


Creatinine     (0.8-1.3)  mg/dL


 


Est Cr Clr Drug Dosing     mL/min


 


Estimated GFR (MDRD)     ml/min


 


Glucose     ()  mg/dL


 


POC Glucose  94   107  ()  mg/dL


 


Calcium     (8.5-10.1)  mg/dL


 


Total Bilirubin     (0.2-1.0)  mg/dL


 


AST     (15-37)  IU/L


 


ALT     (14-63)  IU/L


 


Alkaline Phosphatase     ()  U/L


 


Total Protein     (6.4-8.2)  g/dL


 


Albumin     (3.4-5.0)  g/dL


 


Globulin     (2.0-3.5)  g/dL


 


Albumin/Globulin Ratio     (1.3-2.8)  


 


Triglycerides   2483 H   (0-200)  mg/dL


 


Cholesterol     ()  mg/dL


 


HDL Cholesterol     (40-60)  mg/dL


 


Cholesterol/HDL Ratio     (3.3-6.0)  














  08/17/18 08/17/18 08/17/18 Range/Units





  04:40 04:40 04:41 


 


WBC  3.46 L    (4.0-11.0)  K/uL


 


RBC  4.21 L    (4.50-5.90)  M/uL


 


Hgb  13.3    (13.0-17.0)  g/dL


 


Hct  36.9 L    (38.0-50.0)  %


 


MCV  87.6    (80.0-98.0)  fL


 


MCH  31.6    (27.0-32.0)  pg


 


MCHC  36.0    (31.0-37.0)  g/dL


 


RDW Std Deviation  45.1    (28.0-62.0)  fl


 


RDW Coeff of Dajuan  14    (11.0-15.0)  %


 


Plt Count  131 L    (150-400)  K/uL


 


MPV  10.50    (7.40-12.00)  fL


 


Nucleated RBC %  0.0    /100WBC


 


Nucleated RBCs #  0    K/uL


 


Sodium   137   (136-148)  mmol/L


 


Potassium   3.8   (3.5-5.1)  mmol/L


 


Chloride   102   ()  mmol/L


 


Carbon Dioxide   24.7   (21.0-32.0)  mmol/L


 


BUN   7   (7.0-18.0)  mg/dL


 


Creatinine   0.9   (0.8-1.3)  mg/dL


 


Est Cr Clr Drug Dosing   93.53   mL/min


 


Estimated GFR (MDRD)   > 60.0   ml/min


 


Glucose   108 H   ()  mg/dL


 


POC Glucose    94  ()  mg/dL


 


Calcium   8.1 L   (8.5-10.1)  mg/dL


 


Total Bilirubin   0.4   (0.2-1.0)  mg/dL


 


AST   19   (15-37)  IU/L


 


ALT   19   (14-63)  IU/L


 


Alkaline Phosphatase   54   ()  U/L


 


Total Protein   6.8   (6.4-8.2)  g/dL


 


Albumin   3.3 L   (3.4-5.0)  g/dL


 


Globulin   3.5   (2.0-3.5)  g/dL


 


Albumin/Globulin Ratio   0.9 L   (1.3-2.8)  


 


Triglycerides   2359 H   (0-200)  mg/dL


 


Cholesterol   437 H   ()  mg/dL


 


HDL Cholesterol   36 L   (40-60)  mg/dL


 


Cholesterol/HDL Ratio   12.1 H   (3.3-6.0)  














  08/17/18 Range/Units





  08:08 


 


WBC   (4.0-11.0)  K/uL


 


RBC   (4.50-5.90)  M/uL


 


Hgb   (13.0-17.0)  g/dL


 


Hct   (38.0-50.0)  %


 


MCV   (80.0-98.0)  fL


 


MCH   (27.0-32.0)  pg


 


MCHC   (31.0-37.0)  g/dL


 


RDW Std Deviation   (28.0-62.0)  fl


 


RDW Coeff of Dajuan   (11.0-15.0)  %


 


Plt Count   (150-400)  K/uL


 


MPV   (7.40-12.00)  fL


 


Nucleated RBC %   /100WBC


 


Nucleated RBCs #   K/uL


 


Sodium   (136-148)  mmol/L


 


Potassium   (3.5-5.1)  mmol/L


 


Chloride   ()  mmol/L


 


Carbon Dioxide   (21.0-32.0)  mmol/L


 


BUN   (7.0-18.0)  mg/dL


 


Creatinine   (0.8-1.3)  mg/dL


 


Est Cr Clr Drug Dosing   mL/min


 


Estimated GFR (MDRD)   ml/min


 


Glucose   ()  mg/dL


 


POC Glucose  84  ()  mg/dL


 


Calcium   (8.5-10.1)  mg/dL


 


Total Bilirubin   (0.2-1.0)  mg/dL


 


AST   (15-37)  IU/L


 


ALT   (14-63)  IU/L


 


Alkaline Phosphatase   ()  U/L


 


Total Protein   (6.4-8.2)  g/dL


 


Albumin   (3.4-5.0)  g/dL


 


Globulin   (2.0-3.5)  g/dL


 


Albumin/Globulin Ratio   (1.3-2.8)  


 


Triglycerides   (0-200)  mg/dL


 


Cholesterol   ()  mg/dL


 


HDL Cholesterol   (40-60)  mg/dL


 


Cholesterol/HDL Ratio   (3.3-6.0)  











Med Orders - Current: 


 Current Medications





Acetaminophen (Tylenol)  650 mg PO Q6H PRN


   PRN Reason: Pain


Diphenhydramine HCl (Benadryl)  25 mg PO Q6H PRN


   PRN Reason: Itching


   Last Admin: 08/17/18 01:48 Dose:  25 mg


Fish Oil (Fish Oil)  2 gm PO BID [facility]


   Last Admin: 08/17/18 10:20 Dose:  2 gm


Gemfibrozil (Lopid)  600 mg PO BID [facility]


   Last Admin: 08/17/18 10:20 Dose:  600 mg


Hydromorphone HCl (Dilaudid)  1 mg IVPUSH Q2H PRN


   PRN Reason: Pain


   Last Admin: 08/17/18 04:55 Dose:  1 mg


Dextrose/Sodium Chloride (Dextrose 5%-Normal Saline)  1,000 mls @ 150 mls/hr IV 

ASDIRECTED DAVIE


   Last Admin: 08/17/18 10:23 Dose:  150 mls/hr


Insulin Human Regular 100 unit (/ Sodium Chloride)  100 mls @ 1 mls/hr IV Q24H 

DAVIE; Protocol


Ondansetron HCl (Zofran)  4 mg IVPUSH Q4H PRN


   PRN Reason: Nausea/Vomiting


Oxycodone/Acetaminophen (Percocet 325-5 Mg)  1 tab PO Q6H PRN


   PRN Reason: Pain


   Last Admin: 08/17/18 00:17 Dose:  1 tab


Sodium Chloride (Saline Flush)  2.5 ml FLUSH ASDIRECTED PRN


   PRN Reason: Keep Vein Open


   Last Admin: 08/14/18 01:16 Dose:  2.5 ml


Sodium Chloride (Saline Flush)  10 ml FLUSH ASDIRECTED PRN


   PRN Reason: Keep Vein Open


   Last Admin: 08/14/18 01:15 Dose:  10 ml


Sodium Chloride (Saline Flush)  2.5 ml FLUSH ASDIRECTED PRN


   PRN Reason: Keep Vein Open





Discontinued Medications





Hydromorphone HCl (Dilaudid)  1 mg IVPUSH ONETIME ONE


   Stop: 08/14/18 01:46


   Last Admin: 08/14/18 02:00 Dose:  1 mg


Hydromorphone HCl (Dilaudid) Confirm Administered Dose 1 mg .ROUTE .STK-MED ONE


   Stop: 08/14/18 04:14


   Last Admin: 08/14/18 04:45 Dose:  Not Given


Hydromorphone HCl (Dilaudid)  1 mg IVPUSH ONETIME ONE


   Stop: 08/14/18 04:45


   Last Admin: 08/14/18 04:14 Dose:  1 mg


Sodium Chloride (Normal Saline)  1,000 mls @ 999 mls/hr IV BOLUS ONE


   Stop: 08/14/18 02:00


   Last Admin: 08/14/18 01:31 Dose:  999 mls/hr


Sodium Chloride (Normal Saline)  1,000 mls @ 999 mls/hr IV STAT ONE


   Stop: 08/14/18 05:44


   Last Admin: 08/14/18 03:00 Dose:  999 mls/hr


Sodium Chloride (Normal Saline)  1,000 mls @ 200 mls/hr IV ASDIRECTED DAVIE


   Last Admin: 08/14/18 11:41 Dose:  200 mls/hr


Insulin Human Regular 100 unit (/ Sodium Chloride)  100 mls @ 1 mls/hr IV 

TITRATE DAVIE; Protocol


   Last Titration: 08/15/18 07:57 Dose:  0 unit/hr, 0 mls/hr


Dextrose/Sodium Chloride (Dextrose 5%-Normal Saline)  1,000 mls @ 150 mls/hr IV 

ASDIRECTED DAVIE


   Last Admin: 08/15/18 04:43 Dose:  150 mls/hr


Sodium Chloride (Normal Saline)  1,000 mls @ 150 mls/hr IV ASDIRECTED DAVIE


   Last Admin: 08/16/18 08:56 Dose:  150 mls/hr


Insulin Human Regular 100 unit (/ Sodium Chloride)  100 mls @ 1 mls/hr IV 

TITRATE DAVIE; Protocol


   Last Titration: 08/16/18 15:15 Dose:  0.5 unit/hr, 0.5 mls/hr


Iopamidol (Isovue Multipack-370 (76%))  100 ml IVPUSH ONETIME ONE


   Stop: 08/14/18 04:42


   Last Admin: 08/14/18 04:43 Dose:  100 ml


Ketorolac Tromethamine (Toradol)  30 mg IVPUSH ONETIME ONE


   Stop: 08/14/18 01:01


   Last Admin: 08/14/18 01:35 Dose:  30 mg


Ondansetron HCl (Zofran)  4 mg IVPUSH ONETIME ONE


   Stop: 08/14/18 01:01


   Last Admin: 08/14/18 01:33 Dose:  4 mg

## 2018-08-30 ENCOUNTER — HOSPITAL ENCOUNTER (EMERGENCY)
Dept: HOSPITAL 56 - MW.ED | Age: 43
Discharge: HOME | End: 2018-08-30
Payer: COMMERCIAL

## 2018-08-30 DIAGNOSIS — K52.9: Primary | ICD-10-CM

## 2018-08-30 LAB
CHLORIDE SERPL-SCNC: 103 MMOL/L (ref 98–107)
SODIUM SERPL-SCNC: 137 MMOL/L (ref 136–148)

## 2018-08-30 PROCEDURE — 80305 DRUG TEST PRSMV DIR OPT OBS: CPT

## 2018-08-30 PROCEDURE — 96374 THER/PROPH/DIAG INJ IV PUSH: CPT

## 2018-08-30 PROCEDURE — 74177 CT ABD & PELVIS W/CONTRAST: CPT

## 2018-08-30 PROCEDURE — 84484 ASSAY OF TROPONIN QUANT: CPT

## 2018-08-30 PROCEDURE — 99284 EMERGENCY DEPT VISIT MOD MDM: CPT

## 2018-08-30 PROCEDURE — 87086 URINE CULTURE/COLONY COUNT: CPT

## 2018-08-30 PROCEDURE — 83690 ASSAY OF LIPASE: CPT

## 2018-08-30 PROCEDURE — 80053 COMPREHEN METABOLIC PANEL: CPT

## 2018-08-30 PROCEDURE — 36415 COLL VENOUS BLD VENIPUNCTURE: CPT

## 2018-08-30 PROCEDURE — 82150 ASSAY OF AMYLASE: CPT

## 2018-08-30 PROCEDURE — 96375 TX/PRO/DX INJ NEW DRUG ADDON: CPT

## 2018-08-30 PROCEDURE — 85025 COMPLETE CBC W/AUTO DIFF WBC: CPT

## 2018-08-30 PROCEDURE — 93005 ELECTROCARDIOGRAM TRACING: CPT

## 2018-08-30 PROCEDURE — 96361 HYDRATE IV INFUSION ADD-ON: CPT

## 2018-08-30 PROCEDURE — 81001 URINALYSIS AUTO W/SCOPE: CPT

## 2018-08-30 NOTE — CT
CT of the abdomen and pelvis with contrast.

 

HISTORY: Pain

 

TECHNIQUE: Axial CT images were obtained of the abdomen and pelvis following administration of 100 mL
 of Isovue-370 in the left antecubital fossa without complication. Coronal and sagittal reconstructio
ns obtained.

 

Comparison: 8/14/2018.

 

FINDINGS: 

The lung bases are clear, no pleural effusion. The liver appears normal. The spleen is mildly enlarge
d at 15.2 x 5.8 cm. The adrenal glands and pancreas appear normal. Cholecystectomy. There is mild str
anding along the mesenteric root, less pronounced comparison to the previous examination. The previou
s examination done. Duodenal and proximal jejunal wall thickening. No bulky retroperitoneal lymphaden
opathy or free fluid.

 

The kidneys enhance and function symmetrically without evidence of obstructive uropathy.

 

The large and small bowel are normal in caliber without evidence of obstruction. No focal pericolonic
 inflammation or stranding. The appendix appears normal. Urinary bladder is normal. No pelvic lymphad
enopathy or free pelvic fluid.

 

No suspicious osseous abnormalities identified.

 

IMPRESSION: 

 

1. Mild stranding along the mesenteric root and less so along the distal duodenum and proximal jejunu
m. The etiology is uncertain however this has mildly improved in comparison to the recent CT. Differe
ntial includes enteritis versus peptic ulcer disease.

2. Mild splenomegaly.

## 2018-08-30 NOTE — EDM.PDOC
ED HPI GENERAL MEDICAL PROBLEM





- General


Stated Complaint: UPPER ABDOMINAL PAIN


Time Seen by Provider: 08/30/18 13:19


Source of Information: Reports: Patient


History Limitations: Reports: No Limitations





- History of Present Illness


INITIAL COMMENTS - FREE TEXT/NARRATIVE: 





HISTORY AND PHYSICAL:


[]43-year-old male presenting with upper abdominal pain





History of Present Illness:


[Patient was in the emergency room 2 weeks ago and diagnosed with pancreatitis 

and  presents today with  pain in the same area but is different.


He states the pain was intermittant yesterday after drinking caffiene. Now pain 

is constant.


History pancreatitis


History of hypertriglyceridemia





Review of Systems:


As per history of present illness and below otherwise all 


systems reviewed and negative.  





Past medical history:


As per history of present illness and as reviewed below


otherwise noncontributory.





Surgical history:


As per history of present illness and as reviewed below


otherwise noncontributory.





Social history:


No reported history of drug or alcohol abuse.





Family history:


As per history of present illness and as reviewed below


otherwise noncontributory.





Physical exam:


Alert & oriented male answering questions appropriately in full sentences 

without shortness breath. He nontoxic in appearance.


HEENT: Atraumatic, normocehpalic, pupils reactive, negative for conjunctival 

pallor or scleral icterus, mucous membranes moist, throat clear, neck supple, 

nontender, trachea midline.  


Lungs: Clear to auscultation, breath sounds equal bilaterally, chest non 

tender.  


Heart: S1S2, regular, negative for clicks, rubs, or JVD.


Abdomen: Soft, nondistended, nontender.  Negative for masses or 

hepatossplenmegaly. Negative for costovertebral tenderness.


Pelvis: Stable nontender.


Genitourinary: Deferred.


Rectal: Deferred


Extremities: Atraumatic, negative for cords or calf pain.  


Neurovascular unremarkable.


Neuro:  Awake, alert, oriented.  Cranial nerves II through XII


unremarkable.  Cerebellum unremarkable.  Motor and sensory unremarkable 

throughout.  Exam nonfocal.  


Stresses case with Dr. Sumner who is on-call for surgery. Commended to put him 

on PPI and follow-up in the clinic with her.





Diagnostics:


[]cbc, cmp, amylase, lipase ua urine culture, drug screen.





Therapeutics:


[]ns


pepcid





Impression:


[]Enteritis vs peptic ulcer disease





Plan:


[]Discharged home


Follow up with your primary care provider


Referral to Dr. Nanette Sumner


[facility]


Specialty Care - General Surgery


[facility] [address] [facility]


[address] [place] [address], [address]


[place] [address]


Phone: [phone]


Fax: [phone]


Return to the emergency room as directed and discussed








Definitive disposition and diagnosis as appropriate pending


reevaluation and review of above.  





Onset: Gradual


Duration: Day(s):, Getting Worse


Location: Reports: Abdomen


Quality: Reports: Ache


Severity: Moderate


Improves with: Reports: None


Worsens with: Reports: None


Associated Symptoms: Reports: No Other Symptoms


  ** Epigastric


Pain Score (Numeric/FACES): 5





- Related Data


 Allergies











Allergy/AdvReac Type Severity Reaction Status Date / Time


 


No Known Allergies Allergy   Verified 08/30/18 13:28











Home Meds: 


 Home Meds





Gemfibrozil [Lopid] 600 mg PO BID 30 Days #60 tablet 08/17/18 [Rx]


Omeprazole 20 mg PO DAILY #20 tab.rap.dr 08/30/18 [Rx]











Past Medical History


Cardiovascular History: Reports: High Cholesterol


Other Cardiovascular History: high triglycerides


Gastrointestinal History: Reports: Pancreatitis


Other Gastrointestinal History: pancreatitis x9; has a familial hyperlipidemia 

which he states it is linked to the pancreatitis





- Infectious Disease History


Infectious Disease History: Reports: Chicken Pox





- Past Surgical History


GI Surgical History: Reports: Cholecystectomy





Social & Family History





- Family History


Family Medical History: Noncontributory





- Caffeine Use


Caffeine Use: Reports: Coffee





ED ROS GENERAL





- Review of Systems


Review Of Systems: ROS reveals no pertinent complaints other than HPI.





ED EXAM, GENERAL





- Physical Exam


Exam: See Below (see dictation)





Course





- Vital Signs


Last Recorded V/S: 


 Last Vital Signs











Temp  36.9 C   08/30/18 13:29


 


Pulse  87   08/30/18 13:29


 


Resp  16   08/30/18 13:29


 


BP  113/60   08/30/18 13:29


 


Pulse Ox  95   08/30/18 13:29














- Orders/Labs/Meds


Orders: 


 Active Orders 24 hr











 Category Date Time Status


 


 EKG Documentation Completion [RC] STAT Care  08/30/18 13:31 Active


 


 CULTURE URINE [RM] Stat Lab  08/30/18 14:15 Received


 


 DRUG SCREEN, URINE [URCHEM] Stat Lab  08/30/18 14:15 Ordered


 


 UA W/MICROSCOPIC [URIN] Stat Lab  08/30/18 14:15 Ordered


 


 Sodium Chloride 0.9% [Saline Flush] Med  08/30/18 13:22 Active





 10 ml FLUSH ASDIRECTED PRN   


 


 Sodium Chloride 0.9% [Saline Flush] Med  08/30/18 13:22 Active





 2.5 ml FLUSH ASDIRECTED PRN   


 


 Saline Lock Insert [OM.PC] Stat Oth  08/30/18 13:22 Ordered








 Medication Orders





Sodium Chloride (Saline Flush)  10 ml FLUSH ASDIRECTED PRN


   PRN Reason: Keep Vein Open


Sodium Chloride (Saline Flush)  2.5 ml FLUSH ASDIRECTED PRN


   PRN Reason: Keep Vein Open








Labs: 


 Laboratory Tests











  08/30/18 08/30/18 08/30/18 Range/Units





  13:45 13:45 13:45 


 


WBC  9.32    (4.0-11.0)  K/uL


 


RBC  4.33 L    (4.50-5.90)  M/uL


 


Hgb  15.5    (13.0-17.0)  g/dL


 


Hct  37.6 L    (38.0-50.0)  %


 


MCV  86.8    (80.0-98.0)  fL


 


MCH  35.8 H    (27.0-32.0)  pg


 


MCHC  41.2 H    (31.0-37.0)  g/dL


 


RDW Std Deviation  44.3    (28.0-62.0)  fl


 


RDW Coeff of Dajuan  14    (11.0-15.0)  %


 


Plt Count  208    (150-400)  K/uL


 


MPV  10.40    (7.40-12.00)  fL


 


Add Manual Diff  YES    


 


Neutrophils % (Manual)  65    (48.0-80.0)  %


 


Band Neutrophils %  4    %


 


Lymphocytes % (Manual)  24    (16.0-40.0)  %


 


Monocytes % (Manual)  5    (0.0-15.0)  %


 


Eosinophils % (Manual)  2    (0.0-7.0)  %


 


Nucleated RBC %  0.0    /100WBC


 


Absolute Seg Neuts  6.1 H    (1.4-5.7)  


 


Band Neutrophils #  0.4    


 


Lymphocytes # (Manual)  2.2    (0.6-2.4)  


 


Monocytes # (Manual)  0.5    (0.0-0.8)  


 


Eosinophils # (Manual)  0.2    (0.0-0.7)  


 


Nucleated RBCs #  0    K/uL


 


Sodium   137   (136-148)  mmol/L


 


Potassium   4.5   (3.5-5.1)  mmol/L


 


Chloride   103   ()  mmol/L


 


Carbon Dioxide   22.9   (21.0-32.0)  mmol/L


 


BUN   20 H   (7.0-18.0)  mg/dL


 


Creatinine   1.0   (0.8-1.3)  mg/dL


 


Est Cr Clr Drug Dosing   84.18   mL/min


 


Estimated GFR (MDRD)   > 60.0   ml/min


 


Glucose   126 H   ()  mg/dL


 


Calcium   8.6   (8.5-10.1)  mg/dL


 


Total Bilirubin   0.7   (0.2-1.0)  mg/dL


 


AST   27   (15-37)  IU/L


 


ALT   29   (14-63)  IU/L


 


Alkaline Phosphatase   70   ()  U/L


 


Troponin I    < 0.050  (0.000-0.056)  ng/mL


 


Total Protein   7.6   (6.4-8.2)  g/dL


 


Albumin   4.0   (3.4-5.0)  g/dL


 


Globulin   3.6 H   (2.0-3.5)  g/dL


 


Albumin/Globulin Ratio   1.1 L   (1.3-2.8)  


 


Amylase   31   ()  U/L


 


Lipase   175   ()  U/L


 


Urine Color     


 


Urine Appearance     


 


Urine pH     (5.0-8.0)  


 


Ur Specific Gravity     (1.001-1.035)  


 


Urine Protein     (NEGATIVE)  mg/dL


 


Urine Glucose (UA)     (NEGATIVE)  mg/dL


 


Urine Ketones     (NEGATIVE)  mg/dL


 


Urine Occult Blood     (NEGATIVE)  


 


Urine Nitrite     (NEGATIVE)  


 


Urine Bilirubin     (NEGATIVE)  


 


Urine Urobilinogen     (<2.0)  EU/dL


 


Ur Leukocyte Esterase     (NEGATIVE)  


 


Urine RBC     (0-2/HPF)  


 


Urine WBC     (0-5/HPF)  


 


Ur Epithelial Cells     (NONE-FEW)  


 


Urine Bacteria     (NEGATIVE)  


 


Urine Opiates Screen     (NEGATIVE)  


 


Ur Oxycodone Screen     (NEGATIVE)  


 


Urine Methadone Screen     (NEGATIVE)  


 


Ur Barbiturates Screen     (NEGATIVE)  


 


Ur Phencyclidine Scrn     (NEGATIVE)  


 


Ur Amphetamine Screen     (NEGATIVE)  


 


U Methamphetamines Scrn     (NEGATIVE)  


 


U Benzodiazepines Scrn     (NEGATIVE)  


 


U Cocaine Metab Screen     (NEGATIVE)  


 


U Marijuana (THC) Screen     (NEGATIVE)  














  08/30/18 08/30/18 Range/Units





  14:15 14:15 


 


WBC    (4.0-11.0)  K/uL


 


RBC    (4.50-5.90)  M/uL


 


Hgb    (13.0-17.0)  g/dL


 


Hct    (38.0-50.0)  %


 


MCV    (80.0-98.0)  fL


 


MCH    (27.0-32.0)  pg


 


MCHC    (31.0-37.0)  g/dL


 


RDW Std Deviation    (28.0-62.0)  fl


 


RDW Coeff of Dajuan    (11.0-15.0)  %


 


Plt Count    (150-400)  K/uL


 


MPV    (7.40-12.00)  fL


 


Add Manual Diff    


 


Neutrophils % (Manual)    (48.0-80.0)  %


 


Band Neutrophils %    %


 


Lymphocytes % (Manual)    (16.0-40.0)  %


 


Monocytes % (Manual)    (0.0-15.0)  %


 


Eosinophils % (Manual)    (0.0-7.0)  %


 


Nucleated RBC %    /100WBC


 


Absolute Seg Neuts    (1.4-5.7)  


 


Band Neutrophils #    


 


Lymphocytes # (Manual)    (0.6-2.4)  


 


Monocytes # (Manual)    (0.0-0.8)  


 


Eosinophils # (Manual)    (0.0-0.7)  


 


Nucleated RBCs #    K/uL


 


Sodium    (136-148)  mmol/L


 


Potassium    (3.5-5.1)  mmol/L


 


Chloride    ()  mmol/L


 


Carbon Dioxide    (21.0-32.0)  mmol/L


 


BUN    (7.0-18.0)  mg/dL


 


Creatinine    (0.8-1.3)  mg/dL


 


Est Cr Clr Drug Dosing    mL/min


 


Estimated GFR (MDRD)    ml/min


 


Glucose    ()  mg/dL


 


Calcium    (8.5-10.1)  mg/dL


 


Total Bilirubin    (0.2-1.0)  mg/dL


 


AST    (15-37)  IU/L


 


ALT    (14-63)  IU/L


 


Alkaline Phosphatase    ()  U/L


 


Troponin I    (0.000-0.056)  ng/mL


 


Total Protein    (6.4-8.2)  g/dL


 


Albumin    (3.4-5.0)  g/dL


 


Globulin    (2.0-3.5)  g/dL


 


Albumin/Globulin Ratio    (1.3-2.8)  


 


Amylase    ()  U/L


 


Lipase    ()  U/L


 


Urine Color  YELLOW   


 


Urine Appearance  CLEAR   


 


Urine pH  6.0   (5.0-8.0)  


 


Ur Specific Gravity  1.025   (1.001-1.035)  


 


Urine Protein  NEGATIVE   (NEGATIVE)  mg/dL


 


Urine Glucose (UA)  NEGATIVE   (NEGATIVE)  mg/dL


 


Urine Ketones  NEGATIVE   (NEGATIVE)  mg/dL


 


Urine Occult Blood  SMALL H   (NEGATIVE)  


 


Urine Nitrite  NEGATIVE   (NEGATIVE)  


 


Urine Bilirubin  NEGATIVE   (NEGATIVE)  


 


Urine Urobilinogen  0.2   (<2.0)  EU/dL


 


Ur Leukocyte Esterase  NEGATIVE   (NEGATIVE)  


 


Urine RBC  0-1   (0-2/HPF)  


 


Urine WBC  0-1   (0-5/HPF)  


 


Ur Epithelial Cells  RARE   (NONE-FEW)  


 


Urine Bacteria  RARE   (NEGATIVE)  


 


Urine Opiates Screen   NEGATIVE  (NEGATIVE)  


 


Ur Oxycodone Screen   NEGATIVE  (NEGATIVE)  


 


Urine Methadone Screen   NEGATIVE  (NEGATIVE)  


 


Ur Barbiturates Screen   NEGATIVE  (NEGATIVE)  


 


Ur Phencyclidine Scrn   NEGATIVE  (NEGATIVE)  


 


Ur Amphetamine Screen   NEGATIVE  (NEGATIVE)  


 


U Methamphetamines Scrn   NEGATIVE  (NEGATIVE)  


 


U Benzodiazepines Scrn   NEGATIVE  (NEGATIVE)  


 


U Cocaine Metab Screen   NEGATIVE  (NEGATIVE)  


 


U Marijuana (THC) Screen   NEGATIVE  (NEGATIVE)  











Meds: 


Medications











Generic Name Dose Route Start Last Admin





  Trade Name Freq  PRN Reason Stop Dose Admin


 


Sodium Chloride  10 ml  08/30/18 13:22  





  Saline Flush  FLUSH   





  ASDIRECTED PRN   





  Keep Vein Open   





     





     





     


 


Sodium Chloride  2.5 ml  08/30/18 13:22  





  Saline Flush  FLUSH   





  ASDIRECTED PRN   





  Keep Vein Open   





     





     





     














Discontinued Medications














Generic Name Dose Route Start Last Admin





  Trade Name Freq  PRN Reason Stop Dose Admin


 


Famotidine  20 mg  08/30/18 13:22  08/30/18 13:58





  Pepcid  IVPUSH  08/30/18 13:23  20 mg





  ONETIME ONE   Administration





     





     





     





     


 


Sodium Chloride  1,000 mls @ 999 mls/hr  08/30/18 13:22  08/30/18 13:57





  Normal Saline  IV  08/30/18 14:22  999 mls/hr





  STAT ONE   Administration





     





     





     





     


 


Sodium Chloride  1,000 mls @ 999 mls/hr  08/30/18 14:50  08/30/18 15:04





  Normal Saline  IV  08/30/18 15:50  999 mls/hr





  STAT ONE   Administration





     





     





     





     


 


Iopamidol  100 ml  08/30/18 15:42  08/30/18 15:46





  Isovue Multipack-370 (76%)  IVPUSH  08/30/18 15:43  100 ml





  ONETIME STA   Administration





     





     





     





     


 


Ketorolac Tromethamine  30 mg  08/30/18 14:50  08/30/18 15:21





  Toradol  IVPUSH  08/30/18 14:51  Not Given





  ONETIME ONE   





     





     





     





     


 


Morphine Sulfate  2 mg  08/30/18 14:50  08/30/18 15:06





  Morphine  IVPUSH  08/30/18 14:51  2 mg





  ONETIME ONE   Administration





     





     





     





     


 


Ondansetron HCl  4 mg  08/30/18 14:50  08/30/18 15:05





  Zofran  IVPUSH  08/30/18 14:51  4 mg





  ONETIME ONE   Administration





     





     





     





     














Departure





- Departure


Time of Disposition: 16:37


Disposition: Home, Self-Care 01


Condition: Good


Clinical Impression: 


 Enteritis, Abdominal pain in male








- Discharge Information


*PRESCRIPTION DRUG MONITORING PROGRAM REVIEWED*: Not Applicable


*COPY OF PRESCRIPTION DRUG MONITORING REPORT IN PATIENT HUMBERTO: Not Applicable


Prescriptions: 


Omeprazole 20 mg PO DAILY #20 tab.deana.


Instructions:  Abdominal Pain, Adult, Easy-to-Read


Referrals: 


PCP,None [Primary Care Provider] - 


Additional Instructions: 


The following information is given to patients seen in the emergency department 

who are being discharged to home. This information is to outline your options 

for follow-up care. We provide all patients seen in our emergency department 

with a follow-up referral.





The need for follow-up, as well as the timing and circumstances, are variable 

depending upon the specifics of your emergency department visit.





If you don't have a primary care physician on staff, we will provide you with a 

referral. We always advise you to contact your personal physician following an 

emergency department visit to inform them of the circumstance of the visit and 

for follow-up with them and/or the need for any referrals to a consulting 

specialist.





The emergency department will also refer you to a specialist when appropriate. 

This referral assures that you have the opportunity for followup care with a 

specialist. All of these measure are taken in an effort to provide you with 

optimal care, which includes your followup.





Under all circumstances we always encourage you to contact your private 

physician who remains a resource for coordinating  your care. When calling for 

followup care, please make the office aware that this follow-up is from your 

recent emergency room visit. If for any reason you are refused follow-up, 

please contact the [facility] emergency department at [phone] 

and asked to speak to the emergency department charge nurse.





Discharged home


Follow up with your primary care provider


Referral to Dr. Nanette Sumner


[facility]


Specialty Care - General Surgery


[facility]


[address] [place] [address], [address]


[place] [address]


Phone: [phone]


Fax: [phone]


Return to the emergency room as directed and discussed





- My Orders


Last 24 Hours: 


My Active Orders





08/30/18 13:22


Sodium Chloride 0.9% [Saline Flush]   10 ml FLUSH ASDIRECTED PRN 


Sodium Chloride 0.9% [Saline Flush]   2.5 ml FLUSH ASDIRECTED PRN 


Saline Lock Insert [OM.PC] Stat 





08/30/18 13:31


EKG Documentation Completion [RC] STAT 





08/30/18 14:15


CULTURE URINE [RM] Stat 


DRUG SCREEN, URINE [URCHEM] Stat 


UA W/MICROSCOPIC [URIN] Stat 














- Assessment/Plan


Last 24 Hours: 


My Active Orders





08/30/18 13:22


Sodium Chloride 0.9% [Saline Flush]   10 ml FLUSH ASDIRECTED PRN 


Sodium Chloride 0.9% [Saline Flush]   2.5 ml FLUSH ASDIRECTED PRN 


Saline Lock Insert [OM.PC] Stat 





08/30/18 13:31


EKG Documentation Completion [RC] STAT 





08/30/18 14:15


CULTURE URINE [RM] Stat 


DRUG SCREEN, URINE [URCHEM] Stat 


UA W/MICROSCOPIC [URIN] Stat

## 2019-07-14 ENCOUNTER — HOSPITAL ENCOUNTER (EMERGENCY)
Dept: HOSPITAL 56 - MW.ED | Age: 44
LOS: 1 days | Discharge: HOME | End: 2019-07-15
Payer: MEDICAID

## 2019-07-14 DIAGNOSIS — I25.2: ICD-10-CM

## 2019-07-14 DIAGNOSIS — Z90.49: ICD-10-CM

## 2019-07-14 DIAGNOSIS — Z79.899: ICD-10-CM

## 2019-07-14 DIAGNOSIS — R10.13: Primary | ICD-10-CM

## 2019-07-14 DIAGNOSIS — Z95.1: ICD-10-CM

## 2019-07-14 PROCEDURE — 74177 CT ABD & PELVIS W/CONTRAST: CPT

## 2019-07-14 PROCEDURE — 99284 EMERGENCY DEPT VISIT MOD MDM: CPT

## 2019-07-14 PROCEDURE — 80053 COMPREHEN METABOLIC PANEL: CPT

## 2019-07-14 PROCEDURE — 96361 HYDRATE IV INFUSION ADD-ON: CPT

## 2019-07-14 PROCEDURE — G0480 DRUG TEST DEF 1-7 CLASSES: HCPCS

## 2019-07-14 PROCEDURE — 81003 URINALYSIS AUTO W/O SCOPE: CPT

## 2019-07-14 PROCEDURE — 85025 COMPLETE CBC W/AUTO DIFF WBC: CPT

## 2019-07-14 PROCEDURE — 36415 COLL VENOUS BLD VENIPUNCTURE: CPT

## 2019-07-14 PROCEDURE — 96374 THER/PROPH/DIAG INJ IV PUSH: CPT

## 2019-07-14 PROCEDURE — 83690 ASSAY OF LIPASE: CPT

## 2019-07-15 LAB
CHLORIDE SERPL-SCNC: 106 MMOL/L (ref 98–107)
SODIUM SERPL-SCNC: 142 MMOL/L (ref 136–148)

## 2019-07-15 NOTE — CT
INDICATION: Abdominal pain



TECHNIQUE: CT Abdomen and pelvis with i.v. contrast. Coronal and sagittal 

reformats were obtained.



CONTRAST: 100 mL Isovue 370



COMPARISON: 08/30/2018



FINDINGS: 



Lower chest: Unremarkable. 



Liver: Mild fatty infiltration of the liver is noted. 



Spleen: Mild stable splenomegaly is present with the spleen measuring 14.4 

cm. 



Pancreas: Unremarkable. 



Gallbladder: Previous cholecystectomy noted without significant intra- or 

extrahepatic biliary ductal dilatation seen. 



Kidney: Unremarkable. No kidney or ureteral stones or obstruction seen. 



Adrenal: Unremarkable. 



Bowel: Unremarkable. The appendix is normal in appearance and size. 



Vascular: Unremarkable. 



Lymph: Unremarkable. 



Peritoneum: Unremarkable. Mild ground-glass infiltration in the mesenteric 

root is noted without interval change. No pneumoperitoneum is seen. No 

significant ascites is noted. 



Pelvis: Calcified vasa deferentia noted bilaterally within the pelvis and 

most commonly encountered in diabetic patients. 



Soft tissue: Unremarkable. 



Bone: Unremarkable for age. 



IMPRESSION: 



1. Mild stable splenomegaly is present with the spleen measuring 14.4 cm.



Dictated by Jose Antonio Sanford MD @ 7/15/2019 2:06:25 AM



Please note that all CT scans at this facility use dose modulation, 

iterative reconstruction, and/or weight-based dosing when appropriate to 

reduce radiation dose to as low as reasonably achievable.



Dictated by: Jose Antonio Sanford MD @ 07/15/2019 02:06:27



(Electronically Signed)

## 2019-07-15 NOTE — EDM.PDOC
ED HPI GENERAL MEDICAL PROBLEM





- General


Chief Complaint: Abdominal Pain


Stated Complaint: ABD PAIN


Time Seen by Provider: 07/15/19 02:44


Source of Information: Reports: Patient





- History of Present Illness


INITIAL COMMENTS - FREE TEXT/NARRATIVE: 





HISTORY AND PHYSICAL:


History of present illness:


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 

hypertriglyceridemia presents with abdominal pain epigastric nonradiating not 

associated with shortness of breath or diaphoresis





Patient has no fever nausea vomiting chills sweats no chest pain shortness 

breath headache dizziness palpitation





History of cholecystectomy





Review of systems: 


As per history of present illness and below otherwise all systems reviewed and 

negative.


Past medical history: 


As per history of present illness and as reviewed below otherwise 

noncontributory.


Surgical history: 


As per history of present illness and as reviewed below otherwise 

noncontributory.


Social history: 


No reported history of drug or alcohol abuse.


Family history: 


As per history of present illness and as reviewed below otherwise 

noncontributory.


Physical exam:


HEENT: Atraumatic, normocephalic, pupils reactive, negative for conjunctival 

pallor or scleral icterus, mucous membranes moist, throat clear, neck supple, 

nontender, trachea midline.


Lungs: Clear to auscultation, breath sounds equal bilaterally, chest nontender.


Heart: S1S2, regular, negative for clicks, rubs, or JVD.


Abdomen: Soft, nondistended, diffuse tenderness on deep palpation. Negative for 

masses or hepatosplenomegaly. Negative for costovertebral tenderness.


Pelvis: Stable nontender.


Genitourinary: Deferred.


Rectal: Deferred.


Extremities: Atraumatic, negative for cords or calf pain. Neurovascular 

unremarkable.


Neuro: Awake, alert, oriented. Cranial nerves II through XII unremarkable. 

Cerebellum unremarkable. Motor and sensory unremarkable throughout. Exam 

nonfocal.


Diagnostics:


[CBC CMP lipase


]


Therapeutics:


[Toradol 30 mg IV


Norco


[place] diet


Follow-up with primary


Follow-up with cardiology as per primary care recommendations


Follow-up with GI specialist


]


Impression: 


[Abdominal pain


Chronic history of baseline]


Definitive disposition and diagnosis as appropriate pending reevaluation and 

review of above.


  ** abdomen


Pain Score (Numeric/FACES): 9





- Related Data


 Allergies











Allergy/AdvReac Type Severity Reaction Status Date / Time


 


No Known Allergies Allergy   Verified 07/14/19 22:36











Home Meds: 


 Home Meds





Gemfibrozil [Lopid] 600 mg PO BID 30 Days #60 tablet 08/17/18 [Rx]


Omeprazole 20 mg PO DAILY #20 tab. 08/30/18 [Rx]


Metoprolol Tartrate 1 tab PO DAILY 01/03/19 [History]


Simvastatin 10 mg PO DAILY 01/03/19 [History]











Past Medical History


HEENT History: Reports: None


Cardiovascular History: Reports: High Cholesterol, MI


Other Cardiovascular History: high triglycerides


Respiratory History: Reports: None


Gastrointestinal History: Reports: Pancreatitis


Other Gastrointestinal History: pancreatitis x9; has a familial hyperlipidemia 

which he states it is linked to the pancreatitis


Genitourinary History: Reports: None


Musculoskeletal History: Reports: None


Neurological History: Reports: None


Psychiatric History: Reports: None


Endocrine/Metabolic History: Reports: None


Hematologic History: Reports: None


Immunologic History: Reports: None


Oncologic (Cancer) History: Reports: None


Dermatologic History: Reports: None





- Infectious Disease History


Infectious Disease History: Reports: Chicken Pox





- Past Surgical History


Head Surgeries/Procedures: Reports: None


HEENT Surgical History: Reports: None


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


Respiratory Surgical History: Reports: None


GI Surgical History: Reports: Cholecystectomy


Male  Surgical History: Reports: None


Endocrine Surgical History: Reports: None


Neurological Surgical History: Reports: None


Musculoskeletal Surgical History: Reports: None


Oncologic Surgical History: Reports: None


Dermatological Surgical History: Reports: None





Social & Family History





- Family History


Family Medical History: Noncontributory





- Tobacco Use


Smoking Status *Q: Never Smoker


Second Hand Smoke Exposure: No





- Caffeine Use


Caffeine Use: Reports: Coffee, Soda, Tea


Caffeine Use Comment: hx of energy drink use





- Recreational Drug Use


Recreational Drug Use: No





ED ROS GENERAL





- Review of Systems


Review Of Systems: See Below





ED EXAM, GENERAL





- Physical Exam


Exam: See Below





Course





- Vital Signs


Last Recorded V/S: 





 Last Vital Signs











Temp  98.2 F   07/14/19 22:37


 


Pulse  87   07/15/19 02:35


 


Resp  14   07/15/19 02:35


 


BP  129/79   07/15/19 02:35


 


Pulse Ox  97   07/15/19 02:35














- Orders/Labs/Meds


Labs: 





 Laboratory Tests











  07/14/19 07/14/19 07/14/19 Range/Units





  23:27 23:27 23:35 


 


WBC  4.75    (4.0-11.0)  K/uL


 


RBC  4.33 L    (4.50-5.90)  M/uL


 


Hgb  13.4    (13.0-17.0)  g/dL


 


Hct  38.5    (38.0-50.0)  %


 


MCV  88.9    (80.0-98.0)  fL


 


MCH  30.9    (27.0-32.0)  pg


 


MCHC  34.8    (31.0-37.0)  g/dL


 


RDW Std Deviation  50.3    (28.0-62.0)  fl


 


RDW Coeff of Dajuan  16 H    (11.0-15.0)  %


 


Plt Count  169    (150-400)  K/uL


 


MPV  9.90    (7.40-12.00)  fL


 


Neut % (Auto)  52.2    (48.0-80.0)  %


 


Lymph % (Auto)  31.6    (16.0-40.0)  %


 


Mono % (Auto)  5.5    (0.0-15.0)  %


 


Eos % (Auto)  10.5 H    (0.0-7.0)  %


 


Baso % (Auto)  0.2    (0.0-1.5)  %


 


Neut # (Auto)  2.5    (1.4-5.7)  K/uL


 


Lymph # (Auto)  1.5    (0.6-2.4)  K/uL


 


Mono # (Auto)  0.3    (0.0-0.8)  K/uL


 


Eos # (Auto)  0.5    (0.0-0.7)  K/uL


 


Baso # (Auto)  0.0    (0.0-0.1)  K/uL


 


Nucleated RBC %  0.0    /100WBC


 


Nucleated RBCs #  0    K/uL


 


Sodium   142   (136-148)  mmol/L


 


Potassium   4.3   (3.5-5.1)  mmol/L


 


Chloride   106   ()  mmol/L


 


Carbon Dioxide   26.4   (21.0-32.0)  mmol/L


 


BUN   18   (7.0-18.0)  mg/dL


 


Creatinine   1.0   (0.8-1.3)  mg/dL


 


Est Cr Clr Drug Dosing   83.29   mL/min


 


Estimated GFR (MDRD)   > 60.0   ml/min


 


Glucose   166 H   ()  mg/dL


 


Calcium   8.8   (8.5-10.1)  mg/dL


 


Total Bilirubin   0.5   (0.2-1.0)  mg/dL


 


AST   17   (15-37)  IU/L


 


ALT   36   (14-63)  IU/L


 


Alkaline Phosphatase   60   ()  U/L


 


Total Protein   7.6   (6.4-8.2)  g/dL


 


Albumin   4.0   (3.4-5.0)  g/dL


 


Globulin   3.6   (2.6-4.0)  g/dL


 


Albumin/Globulin Ratio   1.1   (0.9-1.6)  


 


Lipase   156   ()  U/L


 


Urine Color    YELLOW  


 


Urine Appearance    CLEAR  


 


Urine pH    6.5  (5.0-8.0)  


 


Ur Specific Gravity    1.025  (1.001-1.035)  


 


Urine Protein    NEGATIVE  (NEGATIVE)  mg/dL


 


Urine Glucose (UA)    NEGATIVE  (NEGATIVE)  mg/dL


 


Urine Ketones    NEGATIVE  (NEGATIVE)  mg/dL


 


Urine Occult Blood    NEGATIVE  (NEGATIVE)  


 


Urine Nitrite    NEGATIVE  (NEGATIVE)  


 


Urine Bilirubin    NEGATIVE  (NEGATIVE)  


 


Urine Urobilinogen    1.0  (<2.0)  EU/dL


 


Ur Leukocyte Esterase    NEGATIVE  (NEGATIVE)  


 


Ethyl Alcohol   < 3.0   mg/dL











Meds: 





Medications














Discontinued Medications














Generic Name Dose Route Start Last Admin





  Trade Name Freq  PRN Reason Stop Dose Admin


 


Sodium Chloride  1,000 mls @ 999 mls/hr  07/14/19 23:12  07/14/19 23:45





  Normal Saline  IV  07/15/19 00:12  999 mls/hr





  STAT ONE   Administration





     





     





     





     


 


Iopamidol  100 ml  07/15/19 00:43  07/15/19 01:34





  Isovue-370 (76%)  IVPUSH  07/15/19 00:44  100 ml





  ONETIME ONE   Administration





     





     





     





     


 


Ketorolac Tromethamine  30 mg  07/15/19 02:15  07/15/19 02:32





  Toradol  IVPUSH  07/15/19 02:16  30 mg





  ONETIME ONE   Administration





     





     





     





     














Departure





- Departure


Time of Disposition: 02:45


Disposition: Home, Self-Care 01


Condition: Good


Clinical Impression: 


 Abdominal pain








- Discharge Information


Referrals: 


Pricila Kevin DO [Primary Care Provider] - 


Additional Instructions: 


The following information is given to patients seen in the emergency department 

who are being discharged to home. This information is to outline your options 

for follow-up care. We provide all patients seen in our emergency department 

with a follow-up referral.





The need for follow-up, as well as the timing and circumstances, are variable 

depending upon the specifics of your emergency department visit.





If you don't have a primary care physician on staff, we will provide you with a 

referral. We always advise you to contact your personal physician following an 

emergency department visit to inform them of the circumstance of the visit and 

for follow-up with them and/or the need for any referrals to a consulting 

specialist.





The emergency department will also refer you to a specialist when appropriate. 

This referral assures that you have the opportunity for follow-up care with a 

specialist. All of these measure are taken in an effort to provide you with 

optimal care, which includes your follow-up.





Under all circumstances we always encourage you to contact your private 

physician who remains a resource for coordinating your care. When calling for 

follow-up care, please make the office aware that this follow-up is from your 

recent emergency room visit. If for any reason you are refused follow-up, 

please contact the [facility] emergency department at [phone] 

and asked to speak to the emergency department charge nurse.

## 2019-08-23 ENCOUNTER — HOSPITAL ENCOUNTER (EMERGENCY)
Dept: HOSPITAL 41 - JD.ED | Age: 44
Discharge: NURSING, REHAB OR LONG-TERM CARE | End: 2019-08-23
Payer: MEDICAID

## 2019-08-23 DIAGNOSIS — Z90.49: ICD-10-CM

## 2019-08-23 DIAGNOSIS — E78.01: ICD-10-CM

## 2019-08-23 DIAGNOSIS — Z91.018: ICD-10-CM

## 2019-08-23 DIAGNOSIS — I21.4: Primary | ICD-10-CM

## 2019-08-23 DIAGNOSIS — E78.00: ICD-10-CM

## 2019-08-23 DIAGNOSIS — Z79.899: ICD-10-CM

## 2019-08-23 DIAGNOSIS — E78.1: ICD-10-CM

## 2019-08-23 DIAGNOSIS — K85.90: ICD-10-CM

## 2019-08-23 DIAGNOSIS — I25.2: ICD-10-CM

## 2019-08-23 PROCEDURE — 99285 EMERGENCY DEPT VISIT HI MDM: CPT

## 2019-08-23 PROCEDURE — 82553 CREATINE MB FRACTION: CPT

## 2019-08-23 PROCEDURE — 85007 BL SMEAR W/DIFF WBC COUNT: CPT

## 2019-08-23 PROCEDURE — 74176 CT ABD & PELVIS W/O CONTRAST: CPT

## 2019-08-23 PROCEDURE — 80053 COMPREHEN METABOLIC PANEL: CPT

## 2019-08-23 PROCEDURE — 71045 X-RAY EXAM CHEST 1 VIEW: CPT

## 2019-08-23 PROCEDURE — 96375 TX/PRO/DX INJ NEW DRUG ADDON: CPT

## 2019-08-23 PROCEDURE — 93005 ELECTROCARDIOGRAM TRACING: CPT

## 2019-08-23 PROCEDURE — 85730 THROMBOPLASTIN TIME PARTIAL: CPT

## 2019-08-23 PROCEDURE — 84484 ASSAY OF TROPONIN QUANT: CPT

## 2019-08-23 PROCEDURE — 86140 C-REACTIVE PROTEIN: CPT

## 2019-08-23 PROCEDURE — 96376 TX/PRO/DX INJ SAME DRUG ADON: CPT

## 2019-08-23 PROCEDURE — 85027 COMPLETE CBC AUTOMATED: CPT

## 2019-08-23 PROCEDURE — 36415 COLL VENOUS BLD VENIPUNCTURE: CPT

## 2019-08-23 PROCEDURE — 83690 ASSAY OF LIPASE: CPT

## 2019-08-23 PROCEDURE — 96365 THER/PROPH/DIAG IV INF INIT: CPT

## 2019-08-23 PROCEDURE — 85610 PROTHROMBIN TIME: CPT

## 2019-08-23 NOTE — CR
Chest: Portable view of the chest was obtained.

 

Comparison: No prior chest x-ray.

 

Heart size and mediastinum are normal.  Previous sternotomy is seen.  

Lungs are clear.  Bony structures are grossly intact.

 

Impression:

1.  Nothing acute is seen on portable chest x-ray.

 

Diagnostic code #2

## 2019-08-23 NOTE — CT
CT abdomen and pelvis

 

Technique: Multiple axial sections were obtained from above the dome 

of the diaphragm inferiorly through the pubic symphysis.  Intravenous 

contrast not utilized.  Oral contrast has been given.

 

Comparison: No prior abdominal imaging is available.

 

Findings: Slight atelectasis is noted within the left lung base.

 

Noncontrast appearance of the liver shows no focal abnormality.  

Spleen is generous in size with length 14.5 cm.  No focal abnormality 

is appreciated within the spleen.

 

Adrenal glands show no nodule.  Pancreas shows very mild surrounding 

inflammatory change which is felt compatible with mild pancreatitis.

 

Kidneys show no abnormal calcifications or hydronephrosis.  Aorta 

shows no aneurysm.  No retroperitoneal adenopathy or mesenteric 

abnormalities are seen.  Appendix is seen which is normal.  No pelvic 

mass or adenopathy is seen.  No free fluid or inflammatory change is 

seen within the pelvis.

 

Impression:

1.  Very slight inflammatory change around the pancreas suspicious for

 mild pancreatitis.

2.  Mild splenomegaly.  Etiology of this finding is not apparent on 

this exam.

3.  No additional abnormality is seen on noncontrast CT study of the 

abdomen and pelvis.

 

Diagnostic code #3

## 2019-08-23 NOTE — EDM.PDOC
ED HPI GENERAL MEDICAL PROBLEM





- General


Chief Complaint: Abdominal Pain


Stated Complaint: PANCREAITIS


Time Seen by Provider: 08/23/19 11:47


Source of Information: Reports: Patient


History Limitations: Reports: No Limitations





- History of Present Illness


INITIAL COMMENTS - FREE TEXT/NARRATIVE: 





44-year-old male presents for evaluation and treatment of suspected 

pancreatitis. Patient reports that the pain started suddenly this morning 

around 0900. He states is located epigastric area and radiates into his back 

and chest. Reports associated symptoms of nausea and vomiting. Describes the 

pain as a sharp stabbing sensation, similar to pancreatitis when he has had 

this in the past. No lightheadedness, dizziness, syncope, shortness of breath, 

diarrhea or any urinary symptoms. Last episode of pancreatitis was August last 

year.





Patent resides in [place], [facility] he brought his children here to see the 

[employer].





Patient has a history of hypercholesterolemia. Other than history of 

pancreatitis also had quadruple bypass. No history of diabetes.





No tobacco use. 





Patient is a full code. 


Onset: Today, Sudden


Location: Reports: Chest, Abdomen


Treatments PTA: Reports: Other (see below)


Other Treatments PTA: none


  ** Upper Abdomen


Pain Score (Numeric/FACES): 10





- Related Data


 Allergies











Allergy/AdvReac Type Severity Reaction Status Date / Time


 


avocado AdvReac  Abdominal Verified 08/23/19 13:25





   Pain  


 


honey AdvReac  Abdominal Verified 08/23/19 13:25





   Pain  











Home Meds: 


 Home Meds





Gemfibrozil [Lopid] 600 mg PO BID 30 Days #60 tablet 08/17/18 [Rx]


Omeprazole 20 mg PO DAILY #20 tab 08/30/18 [Rx]


Metoprolol Tartrate 25 mg PO DAILY 01/03/19 [History]


Simvastatin 10 mg PO DAILY 01/03/19 [History]











Past Medical History


HEENT History: Reports: None


Cardiovascular History: Reports: High Cholesterol, MI


Other Cardiovascular History: high triglycerides


Respiratory History: Reports: None


Gastrointestinal History: Reports: Pancreatitis


Other Gastrointestinal History: pancreatitis x9; has a familial hyperlipidemia 

which he states it is linked to the pancreatitis


Genitourinary History: Reports: None


Musculoskeletal History: Reports: None


Neurological History: Reports: None


Psychiatric History: Reports: None


Endocrine/Metabolic History: Reports: None


Hematologic History: Reports: None


Immunologic History: Reports: None


Oncologic (Cancer) History: Reports: None


Dermatologic History: Reports: None





- Infectious Disease History


Infectious Disease History: Reports: Chicken Pox





- Past Surgical History


Head Surgeries/Procedures: Reports: None


HEENT Surgical History: Reports: None


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


Other Cardiovascular Surgeries/Procedures: 4 vessels


Respiratory Surgical History: Reports: None


GI Surgical History: Reports: Cholecystectomy


Male  Surgical History: Reports: None


Endocrine Surgical History: Reports: None


Neurological Surgical History: Reports: None


Musculoskeletal Surgical History: Reports: None


Oncologic Surgical History: Reports: None


Dermatological Surgical History: Reports: None





Social & Family History





- Family History


Family Medical History: Noncontributory





- Tobacco Use


Smoking Status *Q: Never Smoker





- Caffeine Use


Caffeine Use: Reports: Coffee


Caffeine Use Comment: hx of energy drink use





- Recreational Drug Use


Recreational Drug Use: No





ED ROS GENERAL





- Review of Systems


Review Of Systems: See Below


Respiratory: Denies: Shortness of Breath


Cardiovascular: Reports: Chest Pain.  Denies: Lightheadedness, Syncope


GI/Abdominal: Reports: Abdominal Pain (epigastric radiating into chest, back ), 

Nausea, Vomiting.  Denies: Diarrhea


: Reports: No Symptoms


Musculoskeletal: Reports: Back Pain





ED EXAM, GI/ABD





- Physical Exam


Exam: See Below


Exam Limited By: No Limitations


General Appearance: Alert, WD/WN, Moderate Distress (vomiting upon my arrival 

in the room), Obese


Respiratory/Chest: No Respiratory Distress, Lungs Clear, Normal Breath Sounds


Cardiovascular: Normal Peripheral Pulses, Regular Rate, Rhythm, No Murmur


GI/Abdominal Exam: Guarding, Tender (epigastric; pain to ligt palpation), 

Abnormal Bowel Sounds (decreased)


Neurological: Alert, Oriented, Normal Cognition


Psychiatric: Normal Affect, Normal Mood


Skin Exam: Warm, Dry, Normal Color





EKG INTERPRETATION


EKG Date: 08/23/19


Time: 12:55


Rhythm: NSR


Rate (Beats/Min): 84


Axis: Normal


P-Wave: Present


QRS: Normal


ST-T: Normal


QT: Normal


EKG Interpretation Comments: 





NSR at 84 bpm. Minimal ST elevated in V1-V$. Q wves inferior leads. Reviewed by 

myself and Dr. Rowan. Dr. Rowan advised against giving thrombolytics at this 

time.





Course





- Vital Signs


Last Recorded V/S: 


 Last Vital Signs











Temp  97.2 F   08/23/19 11:27


 


Pulse  70   08/23/19 11:27


 


Resp  20   08/23/19 11:27


 


BP  168/98 H  08/23/19 11:27


 


Pulse Ox  98   08/23/19 11:27














- Orders/Labs/Meds


Orders: 


 Active Orders 24 hr











 Category Date Time Status


 


 Cardiac Monitoring [RC] .AS DIRECTED Care  08/23/19 11:56 Active


 


 EKG Documentation Completion [RC] ASDIRECTED Care  08/23/19 11:56 Active


 


 Peripheral IV Care [RC] .AS DIRECTED Care  08/23/19 11:58 Active


 


 Abdomen Pelvis wo Cont [CT] Stat Exams  08/23/19 13:19 Taken


 


 HYDROmorphone [Dilaudid] Med  08/23/19 13:41 Once





 1 mg IVPUSH ONETIME ONE   


 


 Heparin Sodium/D5W [Heparin 25,000 Units in D5W 500 ML] Med  08/23/19 13:00 

Active





 25,000 units in 500 ml IV TITRATE   


 


 Sodium Chloride 0.9% [Saline Flush] Med  08/23/19 11:58 Active





 10 ml FLUSH ASDIRECTED PRN   


 


 Sodium Chloride 0.9% [Saline Flush] Med  08/23/19 12:04 Active





 10 ml FLUSH ONETIME PRN   


 


 Peripheral IV Insertion Adult [OM.PC] Routine Oth  08/23/19 11:58 Ordered


 


 EKG 12 Lead [EK] Stat Ther  08/23/19 11:56 Ordered








 Medication Orders





Hydromorphone HCl (Dilaudid)  1 mg IVPUSH ONETIME ONE


   Stop: 08/23/19 13:42


Heparin Sodium/Dextrose (Heparin 25,000 Units In D5w 500 Ml)  25,000 units in 

500 mls @ 20 mls/hr IV TITRATE AARON; Protocol


   Last Admin: 08/23/19 13:24  Dose: 1,000 units/hr, 20 mls/hr


Sodium Chloride (Saline Flush)  10 ml FLUSH ASDIRECTED PRN


   PRN Reason: Keep Vein Open


   Last Admin: 08/23/19 12:13  Dose: 10 ml


Sodium Chloride (Saline Flush)  10 ml FLUSH ONETIME PRN


   PRN Reason: IV FLUSH








Labs: 


 Laboratory Tests











  08/23/19 08/23/19 08/23/19 Range/Units





  11:30 11:30 11:30 


 


WBC   8.23   (4.23-9.07)  K/mm3


 


RBC   4.65   (4.63-6.08)  M/mm3


 


Hgb   15.1   (13.7-17.5)  gm/L


 


Hct   46.5   (40.1-51.0)  %


 


MCV   97.4 H   (79.0-92.2)  fl


 


MCH   32.5 H   (25.7-32.2)  pg


 


MCHC   33.3   (32.2-35.5)  g/dl


 


RDW Std Deviation   44.6 H   (35.1-43.9)  fL


 


Plt Count   225   (163-337)  K/mm3


 


MPV   9.9   (9.4-12.3)  fl


 


Neutrophils % (Manual)   78 H   (40-60)  %


 


Band Neutrophils %   0   (0-10)  %


 


Lymphocytes % (Manual)   20   (20-40)  %


 


Atypical Lymphs %   0   %


 


Monocytes % (Manual)   2   (2-10)  %


 


Eosinophils % (Manual)   0 L   (0.8-7.0)  %


 


Basophils % (Manual)   0 L   (0.2-1.2)  


 


Platelet Estimate   Adequate   


 


RBC Morph Comment   Normal   


 


PT      (9.7-12.0)  SECONDS


 


INR      


 


APTT      (22-31)  SECONDS


 


Sodium  131 L    (136-145)  mEq/L


 


Potassium  4.0    (3.5-5.1)  mEq/L


 


Chloride  97 L    ()  mEq/L


 


Carbon Dioxide  23    (21-32)  mEq/L


 


Anion Gap  15.0    (5-15)  


 


BUN  TNP    


 


Creatinine  TNP    


 


Est Cr Clr Drug Dosing  TNP    


 


Estimated GFR (MDRD)  TNP    


 


BUN/Creatinine Ratio  TNP    


 


Glucose  240 H    ()  mg/dL


 


Calcium  TNP    


 


Total Bilirubin  TNP    


 


AST  TNP    


 


ALT  TNP    


 


Alkaline Phosphatase  TNP    


 


CK-MB (CK-2)     (0-3.6)  ng/ml


 


Troponin I  0.115 H*    (0.00-0.056)  ng/mL


 


C-Reactive Protein  0.3    (<1.0)  mg/dL


 


Total Protein  TNP    


 


Albumin  TNP    


 


Globulin  TNP    


 


Albumin/Globulin Ratio  TNP    


 


Amylase  TNP    


 


Lipase  86180 H    ()  U/L














  08/23/19 Range/Units





  11:30 


 


WBC   (4.23-9.07)  K/mm3


 


RBC   (4.63-6.08)  M/mm3


 


Hgb   (13.7-17.5)  gm/L


 


Hct   (40.1-51.0)  %


 


MCV   (79.0-92.2)  fl


 


MCH   (25.7-32.2)  pg


 


MCHC   (32.2-35.5)  g/dl


 


RDW Std Deviation   (35.1-43.9)  fL


 


Plt Count   (163-337)  K/mm3


 


MPV   (9.4-12.3)  fl


 


Neutrophils % (Manual)   (40-60)  %


 


Band Neutrophils %   (0-10)  %


 


Lymphocytes % (Manual)   (20-40)  %


 


Atypical Lymphs %   %


 


Monocytes % (Manual)   (2-10)  %


 


Eosinophils % (Manual)   (0.8-7.0)  %


 


Basophils % (Manual)   (0.2-1.2)  


 


Platelet Estimate   


 


RBC Morph Comment   


 


PT   (9.7-12.0)  SECONDS


 


INR   


 


APTT   (22-31)  SECONDS


 


Sodium   (136-145)  mEq/L


 


Potassium   (3.5-5.1)  mEq/L


 


Chloride   ()  mEq/L


 


Carbon Dioxide   (21-32)  mEq/L


 


Anion Gap   (5-15)  


 


BUN   


 


Creatinine   


 


Est Cr Clr Drug Dosing   


 


Estimated GFR (MDRD)   


 


BUN/Creatinine Ratio   


 


Glucose   ()  mg/dL


 


Calcium   


 


Total Bilirubin   


 


AST   


 


ALT   


 


Alkaline Phosphatase   


 


CK-MB (CK-2)  1.3  (0-3.6)  ng/ml


 


Troponin I   (0.00-0.056)  ng/mL


 


C-Reactive Protein   (<1.0)  mg/dL


 


Total Protein   


 


Albumin   


 


Globulin   


 


Albumin/Globulin Ratio   


 


Amylase   


 


Lipase   ()  U/L











Meds: 


Medications











Generic Name Dose Route Start Last Admin





  Trade Name Freq  PRN Reason Stop Dose Admin


 


Hydromorphone HCl  1 mg  08/23/19 13:41  





  Dilaudid  IVPUSH  08/23/19 13:42  





  ONETIME ONE   





     





     





     





     


 


Heparin Sodium/Dextrose  25,000 units in 500 mls @ 20 mls/hr  08/23/19 13:00  08 /23/19 13:24





  Heparin 25,000 Units In D5w 500 Ml  IV   1,000 units/hr





  TITRATE AARON   20 mls/hr





     Administration





     





  Protocol   





  1,000 UNITS/HR   


 


Sodium Chloride  10 ml  08/23/19 11:58  08/23/19 12:13





  Saline Flush  FLUSH   10 ml





  ASDIRECTED PRN   Administration





  Keep Vein Open   





     





     





     


 


Sodium Chloride  10 ml  08/23/19 12:04  





  Saline Flush  FLUSH   





  ONETIME PRN   





  IV FLUSH   





     





     





     














Discontinued Medications














Generic Name Dose Route Start Last Admin





  Trade Name Laurel  PRN Reason Stop Dose Admin


 


Aspirin  324 mg  08/23/19 12:45  08/23/19 13:25





  Aspirin  PO  08/23/19 12:46  324 mg





  ONETIME ONE   Administration





     





     





     





     


 


Diatrizoate Meglum/Diatrizoate Sod  120 ml  08/23/19 12:04  08/23/19 13:31





  Gastrografin 37%  PO  08/23/19 12:05  90 ml





  ONETIME ONE   Administration





     





     





     





     


 


Heparin Sodium (Porcine)  4,000 units  08/23/19 12:57  08/23/19 13:21





  Heparin Sodium  IVPUSH  08/23/19 12:58  4,000 units





  .BOLUS ONE   Administration





     





     





     





     


 


Hydromorphone HCl  1 mg  08/23/19 11:56  08/23/19 12:13





  Dilaudid  IVPUSH  08/23/19 11:57  1 mg





  ONETIME ONE   Administration





     





     





     





     


 


Iopamidol  100 ml  08/23/19 12:04  





  Isovue-300 (61%)  IVPUSH  08/23/19 12:05  





  ONETIME ONE   





     





     





     





     


 


Ondansetron HCl  4 mg  08/23/19 11:56  08/23/19 12:13





  Zofran  IVPUSH  08/23/19 11:57  4 mg





  ONETIME ONE   Administration





     





     





     





     














- Radiology Interpretation


Free Text/Narrative:: 





Chest: Portable view of the chest was obtained.


Comparison: No prior chest x-ray.


Heart size and mediastinum are normal.  Previous sternotomy is seen.  Lungs are 

clear.  Bony structures are grossly intact.


Impression:


1.  Nothing acute is seen on portable chest x-ray.





- Re-Assessments/Exams


Free Text/Narrative Re-Assessment/Exam: 





08/23/19 13:44


Labs return. Troponin was rechecked by labs and found to be the same at 0.115. 

Review the patient's labs from [place], in  July he had a normal creatinine 1.0. 

His last reported trop from August of last year was within normal limits in 

[place]. Unable to get a majority of the labs today due to lipemic sample. 

Therefore his CT was change from a CT with contrast to without due to concerns 

of effects of contrast.





Reviewed the labs, EKG and imaging with the patient. We'll send him to [facility] 

[place].





Case discussed with Dr. Luna, hospitalist, on at [facility] in [place]. 

She agrees to accept the patient. He'll go by ground ambulance.





Patient has received aspirin, heparin bolus and heparin drip. He states at this

  the pain is starting to return. He is primarily feeling pain in the 

epigastric region rates as 3 out of 10. No chest pain at this time. Vitals 

remain stable. 





Departure





- Departure


Time of Disposition: 13:49


Disposition: DC/Tfer to Acute Hospital 02


Condition: Serious


Clinical Impression: 


 Acute pancreatitis, Familial hypercholesterolemia, Hypertriglyceridemia, 

familial, Abdominal pain, NSTEMI (non-ST elevated myocardial infarction)








- Discharge Information


*PRESCRIPTION DRUG MONITORING PROGRAM REVIEWED*: No


*COPY OF PRESCRIPTION DRUG MONITORING REPORT IN PATIENT TIFFANIE: No


Referrals: 


PCP,Not In Area [Primary Care Provider] - 


Forms:  ED Department Discharge


Additional Instructions: 


Patient to go by ground ambulance to [facility] in [place], Dr. Luna, 

accepting. 





- My Orders


Last 24 Hours: 


My Active Orders





08/23/19 11:56


Cardiac Monitoring [RC] .AS DIRECTED 


EKG Documentation Completion [RC] ASDIRECTED 


EKG 12 Lead [EK] Stat 





08/23/19 11:58


Peripheral IV Care [RC] .AS DIRECTED 


Sodium Chloride 0.9% [Saline Flush]   10 ml FLUSH ASDIRECTED PRN 


Peripheral IV Insertion Adult [OM.PC] Routine 





08/23/19 12:04


Sodium Chloride 0.9% [Saline Flush]   10 ml FLUSH ONETIME PRN 





08/23/19 13:00


Heparin Sodium/D5W [Heparin 25,000 Units in D5W 500 ML] 25,000 units in 500 ml 

IV TITRATE 





08/23/19 13:19


Abdomen Pelvis wo Cont [CT] Stat 





08/23/19 13:41


HYDROmorphone [Dilaudid]   1 mg IVPUSH ONETIME ONE 














- Assessment/Plan


Last 24 Hours: 


My Active Orders





08/23/19 11:56


Cardiac Monitoring [RC] .AS DIRECTED 


EKG Documentation Completion [RC] ASDIRECTED 


EKG 12 Lead [EK] Stat 





08/23/19 11:58


Peripheral IV Care [RC] .AS DIRECTED 


Sodium Chloride 0.9% [Saline Flush]   10 ml FLUSH ASDIRECTED PRN 


Peripheral IV Insertion Adult [OM.PC] Routine 





08/23/19 12:04


Sodium Chloride 0.9% [Saline Flush]   10 ml FLUSH ONETIME PRN 





08/23/19 13:00


Heparin Sodium/D5W [Heparin 25,000 Units in D5W 500 ML] 25,000 units in 500 ml 

IV TITRATE 





08/23/19 13:19


Abdomen Pelvis wo Cont [CT] Stat 





08/23/19 13:41


HYDROmorphone [Dilaudid]   1 mg IVPUSH ONETIME ONE

## 2020-05-01 ENCOUNTER — HOSPITAL ENCOUNTER (EMERGENCY)
Dept: HOSPITAL 56 - MW.ED | Age: 45
Discharge: HOME | End: 2020-05-01
Payer: MEDICAID

## 2020-05-01 DIAGNOSIS — S59.911A: Primary | ICD-10-CM

## 2020-05-01 DIAGNOSIS — Z79.82: ICD-10-CM

## 2020-05-01 DIAGNOSIS — Z91.018: ICD-10-CM

## 2020-05-01 DIAGNOSIS — E78.00: ICD-10-CM

## 2020-05-01 DIAGNOSIS — Z79.899: ICD-10-CM

## 2020-05-01 DIAGNOSIS — I25.2: ICD-10-CM
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
## 2020-05-01 PROCEDURE — 99283 EMERGENCY DEPT VISIT LOW MDM: CPT

## 2020-05-01 PROCEDURE — 96372 THER/PROPH/DIAG INJ SC/IM: CPT

## 2020-05-01 PROCEDURE — 73080 X-RAY EXAM OF ELBOW: CPT

## 2020-05-01 NOTE — EDM.PDOC
ED HPI GENERAL MEDICAL PROBLEM





- General


Chief Complaint: Upper Extremity Injury/Pain


Stated Complaint: RT ELBOW


Time Seen by Provider: 05/01/20 13:41


Source of Information: Reports: Patient


History Limitations: Reports: No Limitations





- History of Present Illness


INITIAL COMMENTS - FREE TEXT/NARRATIVE: 


HISTORY AND PHYSICAL:





History of present illness:


Patient is a 44-year-old male who presents to the emergency room with 

complaints of right elbow/forearm pain. Prior to arrival he was pushing a 

trundle bed underneath the name when he felt a sharp pain to the elbow and just 

below that area. He states he has increased pain when having to engage the 

forearm muscle or extend and flex the elbow. Besides the pushing mechanism 

there was no physical injury. He denies any numbness, tingling or weakness of 

the affected extremity. Patient denies any fever, chills, headache, change in 

vision, syncope or near syncope. Denies any chest pain, back pain, shortness of 

breath or cough. Denies any GI or  symptoms. Patient has been eating and 

drinking appropriately.





Review of systems: 


As per history of present illness and below otherwise all systems reviewed and 

negative.





Past medical history: 


As per history of present illness and as reviewed below otherwise 

noncontributory.





Surgical history: 


As per history of present illness and as reviewed below otherwise 

noncontributory.





Social history: 


See social history for further information





Family history: 


As per history of present illness and as reviewed below otherwise 

noncontributory.





Physical exam:


General: Well-developed and well-nourished 44-year-old male.  Alert and 

oriented.  Nontoxic-appearing and in no acute distress.


HEENT: Atraumatic, normocephalic, pupils equal and reactive bilaterally, 

negative for conjunctival pallor or scleral icterus, mucous membranes moist, 

TMs normal bilaterally, throat clear, neck supple, nontender, trachea midline. 

No drooling or trismus noted. No meningeal signs. No hot potato voice noted. 


Lungs: Clear to auscultation, breath sounds equal bilaterally, chest nontender.


Heart: S1S2, regular rate and rhythm without overt murmur


Abdomen: Soft, nondistended, nontender. Negative for masses or 

hepatosplenomegaly. Negative for costovertebral tenderness.


Skin: Intact, warm, dry. No lesions or rashes noted.


Extremities: Atraumatic, moves all extremities per self, pain with palpation of 

the proximal forearm (brachioradial aspect) just below the right elbow. Able to 

flex without difficulty; has pain with complete extension of the arm. Strong 

radial pulse. Cap refill less than three seconds. Neurovascular unremarkable.


Neuro: Awake, alert, oriented. Cranial nerves II through XII unremarkable. 

Cerebellum unremarkable. Motor and sensory unremarkable throughout. Exam 

nonfocal.





Notes:


X-ray shows no acute findings. Sling used for right upper extremity for limited 

ROM for suspected brachioradial muscle tear/injury. Will have patient follow up 

with orthopedic provider. Supportive care measures were reviewed and discussed. 

Voices understanding and is agreeable to plan of care. Denies any further 

questions or concerns at this time.





Diagnostics:


X-ray





Therapeutics:


Sling





Prescription:


Tramadol (#15)





Impression: 


Suspected muscle injury at forearm level





Plan:


1. Rest, ice, elevate the affected extremity. Please wear the sling as 

directed. Suspected brachioradial muscle tear/injury, may need MRI at some 

point if pain doesn't improve over the next several days (this needs to be done 

through primary care or orthopedics).


2. Tylenol and/or Ibuprofen as needed for pain management. 


3. Follow up with the Orthopedic provider as we discussed. Return to the ED as 

needed and as discussed.





Definitive disposition and diagnosis as appropriate pending reevaluation and 

review of above.





Onset: Today


  ** Right Arm


Pain Score (Numeric/FACES): 6





- Related Data


 Allergies











Allergy/AdvReac Type Severity Reaction Status Date / Time


 


avocado AdvReac  Abdominal Verified 05/01/20 13:40





   Pain  


 


honey AdvReac  Abdominal Verified 05/01/20 13:40





   Pain  











Home Meds: 


 Home Meds





Aspirin 325 mg PO DAILY 05/01/20 [History]


Metoprolol Succinate 25 mg PO DAILY 05/01/20 [History]


atorvaSTATin [Lipitor] 40 mg PO BID 05/01/20 [History]


gemfibroziL [Gemfibrozil] 600 mg PO BID 05/01/20 [History]


traMADol [Ultram] 50 mg PO Q4H PRN #15 tab 05/01/20 [Rx]











Past Medical History


HEENT History: Reports: None


Cardiovascular History: Reports: High Cholesterol, MI


Other Cardiovascular History: high triglycerides


Respiratory History: Reports: None


Gastrointestinal History: Reports: Pancreatitis


Other Gastrointestinal History: pancreatitis x9; has a familial hyperlipidemia 

which he states it is linked to the pancreatitis


Genitourinary History: Reports: None


Musculoskeletal History: Reports: None


Neurological History: Reports: None


Psychiatric History: Reports: None


Endocrine/Metabolic History: Reports: None


Hematologic History: Reports: None


Immunologic History: Reports: None


Oncologic (Cancer) History: Reports: None


Dermatologic History: Reports: None





- Infectious Disease History


Infectious Disease History: Reports: None





- Past Surgical History


Head Surgeries/Procedures: Reports: None


HEENT Surgical History: Reports: None


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


Other Cardiovascular Surgeries/Procedures: 4 vessels


Respiratory Surgical History: Reports: None


GI Surgical History: Reports: Cholecystectomy


Male  Surgical History: Reports: None


Endocrine Surgical History: Reports: None


Neurological Surgical History: Reports: None


Musculoskeletal Surgical History: Reports: None


Oncologic Surgical History: Reports: None


Dermatological Surgical History: Reports: None





Social & Family History





- Family History


Family Medical History: Noncontributory





- Tobacco Use


Smoking Status *Q: Never Smoker





- Caffeine Use


Caffeine Use: Reports: Coffee


Caffeine Use Comment: hx of energy drink use





- Recreational Drug Use


Recreational Drug Use: No





Review of Systems





- Review of Systems


Review Of Systems: Comprehensive ROS is negative, except as noted in HPI.





ED EXAM, GENERAL





- Physical Exam


Exam: See Below (See dictation)





Course





- Vital Signs


Last Recorded V/S: 


 Last Vital Signs











Temp  96.5 F L  05/01/20 13:41


 


Pulse  91   05/01/20 13:41


 


Resp  17   05/01/20 13:41


 


BP  133/89   05/01/20 13:41


 


Pulse Ox  98   05/01/20 13:41














- Orders/Labs/Meds


Orders: 


 Active Orders 24 hr











 Category Date Time Status


 


 DME for Discharge [COMM] Stat Oth  05/01/20 13:44 Ordered











Meds: 


Medications














Discontinued Medications














Generic Name Dose Route Start Last Admin





  Trade Name Freq  PRN Reason Stop Dose Admin


 


Ketorolac Tromethamine  60 mg  05/01/20 13:44  05/01/20 15:00





  Toradol  IM  05/01/20 13:45  60 mg





  ONETIME ONE   Administration





     





     





     





     














Departure





- Departure


Time of Disposition: 14:36


Disposition: Home, Self-Care 01


Clinical Impression: 


 Injury of muscle at forearm level








- Discharge Information


Prescriptions: 


traMADol [Ultram] 50 mg PO Q4H PRN #15 tab


 PRN Reason: Pain


Referrals: 


[facility] [Primary Care Provider] - 


Forms:  ED Department Discharge


Additional Instructions: 


The following information is given to patients seen in the emergency department 

who are being discharged to home. This information is to outline your options 

for follow-up care. We provide all patients seen in our emergency department 

with a follow-up referral.





The need for follow-up, as well as the timing and circumstances, are variable 

depending upon the specifics of your emergency department visit.





If you don't have a primary care physician on staff, we will provide you with a 

referral. We always advise you to contact your personal physician following an 

emergency department visit to inform them of the circumstance of the visit and 

for follow-up with them and/or the need for any referrals to a consulting 

specialist.





The emergency department will also refer you to a specialist when appropriate. 

This referral assures that you have the opportunity for follow-up care with a 

specialist. All of these measure are taken in an effort to provide you with 

optimal care, which includes your follow-up.





Under all circumstances we always encourage you to contact your private 

physician who remains a resource for coordinating your care. When calling for 

follow-up care, please make the office aware that this follow-up is from your 

recent emergency room visit. If for any reason you are refused follow-up, 

please contact the [facility] Emergency 

Department at [phone] and asked to speak to the emergency department 

charge nurse.





[facility]


Primary Care


[address] [place] [address]


Phone: [phone]


Fax: [phone]





[place] [address]


Phone: [phone]


Fax: [phone]





1. Rest, ice, elevate the affected extremity. Please wear the sling as 

directed. Suspected brachioradial muscle tear/injury, may need MRI at some 

point if pain doesn't improve over the next several days (this needs to be done 

through primary care or orthopedics).


2. Tylenol and/or Ibuprofen as needed for pain management. 


3. Follow up with the Orthopedic provider as we discussed. Return to the ED as 

needed and as discussed.





Sepsis Event Note





- Evaluation


Sepsis Screening Result: No Definite Risk





- Focused Exam


Vital Signs: 


 Vital Signs











  Temp Pulse Resp BP Pulse Ox


 


 05/01/20 13:41  96.5 F L  91  17  133/89  98











Date Exam was Performed: 05/01/20


Time Exam was Performed: 15:16





- My Orders


Last 24 Hours: 


My Active Orders





05/01/20 13:44


DME for Discharge [COMM] Stat 














- Assessment/Plan


Last 24 Hours: 


My Active Orders





05/01/20 13:44


DME for Discharge [COMM] Stat

## 2020-05-01 NOTE — CR
Right elbow: 3 views of the right elbow were obtained.

 

Comparison: No previous right elbow study.

 

Small spur noted off the olecranon process at the attachment of the 

triceps tendon.  Small spur noted at the attachment of the common 

extensor tendon to the lateral epicondyle.  Joint spaces are 

maintained.  No joint effusion is seen.  No acute fracture or other 

abnormality is identified.

 

Impression:

1.  Small olecranon spur and small spur off the lateral epicondyle.

2.  Right elbow study is otherwise unremarkable.

3.  Nothing acute is seen.

 

Diagnostic code #2

 

This report was dictated in MDT

## 2020-12-11 ENCOUNTER — HOSPITAL ENCOUNTER (EMERGENCY)
Dept: HOSPITAL 56 - MW.ED | Age: 45
Discharge: HOME | End: 2020-12-11
Payer: OTHER GOVERNMENT

## 2020-12-11 DIAGNOSIS — E78.00: ICD-10-CM

## 2020-12-11 DIAGNOSIS — Z79.899: ICD-10-CM

## 2020-12-11 DIAGNOSIS — Z91.018: ICD-10-CM

## 2020-12-11 DIAGNOSIS — R10.13: Primary | ICD-10-CM

## 2020-12-11 DIAGNOSIS — E66.9: ICD-10-CM

## 2020-12-11 DIAGNOSIS — I25.2: ICD-10-CM

## 2020-12-11 DIAGNOSIS — Z79.82: ICD-10-CM

## 2020-12-11 LAB
BUN SERPL-MCNC: 17 MG/DL (ref 7–18)
CHLORIDE SERPL-SCNC: 103 MMOL/L (ref 98–107)
CO2 SERPL-SCNC: 22.8 MMOL/L (ref 21–32)
GLUCOSE SERPL-MCNC: 120 MG/DL (ref 74–106)
LIPASE SERPL-CCNC: 110 U/L (ref 73–393)
POTASSIUM SERPL-SCNC: 4.3 MMOL/L (ref 3.5–5.1)
SODIUM SERPL-SCNC: 136 MMOL/L (ref 136–148)

## 2020-12-11 PROCEDURE — 96375 TX/PRO/DX INJ NEW DRUG ADDON: CPT

## 2020-12-11 PROCEDURE — 96374 THER/PROPH/DIAG INJ IV PUSH: CPT

## 2020-12-11 PROCEDURE — 36415 COLL VENOUS BLD VENIPUNCTURE: CPT

## 2020-12-11 PROCEDURE — 80053 COMPREHEN METABOLIC PANEL: CPT

## 2020-12-11 PROCEDURE — 85025 COMPLETE CBC W/AUTO DIFF WBC: CPT

## 2020-12-11 PROCEDURE — 81001 URINALYSIS AUTO W/SCOPE: CPT

## 2020-12-11 PROCEDURE — 83690 ASSAY OF LIPASE: CPT

## 2020-12-11 PROCEDURE — 99284 EMERGENCY DEPT VISIT MOD MDM: CPT

## 2020-12-11 NOTE — EDM.PDOC
ED HPI GENERAL MEDICAL PROBLEM





- General


Chief Complaint: Abdominal Pain


Stated Complaint: POSSIBLE PANCREATITIS


Time Seen by Provider: 12/11/20 17:56


Source of Information: Reports: Patient


History Limitations: Reports: No Limitations





- History of Present Illness


INITIAL COMMENTS - FREE TEXT/NARRATIVE: 


HISTORY AND PHYSICAL:





History of present illness:


Patient is a 45-year-old male who presents to the emergency room with complaints

of upper abdominal pain.  He states he has had several bouts of pancreatitis in 

the past (has hyperlipidemia - states this has been the cause), last time being 

approximately 1 year ago.  This morning is when the pain started and he reports 

it is similar to previous pancreatis episodes.  Patient denies any fever, 

chills, headache, change in vision, syncope or near syncope. Denies any chest 

pain, back pain, shortness of breath or cough. Denies any nausea, vomiting, 

diarrhea, constipation or dysuria. Has not noted any blood in urine or stool. 

Patient has been eating and drinking appropriately.





Review of systems: 


As per history of present illness and below otherwise all systems reviewed and 

negative.





Past medical history: 


As per history of present illness and as reviewed below otherwise 

noncontributory.





Surgical history: 


As per history of present illness and as reviewed below otherwise 

noncontributory.





Social history: 


See social history for further information





Family history: 


As per history of present illness and as reviewed below otherwise 

noncontributory.





Physical exam:


General: Well developed and well nourished. Alert and orientated x 3. Nontoxic 

in appearance and in no acute distress. Vital signs are stable and have been 

reviewed by me. Nursing notes were reviewed. 


HEENT: Atraumatic, normocephalic, pupils equal and reactive bilaterally, 

negative for conjunctival pallor or scleral icterus, mucous membranes moist, TMs

normal bilaterally, throat clear, neck supple, nontender, trachea midline. No 

drooling or trismus noted. No meningeal signs. No hot potato voice noted. 


Lungs: Clear to auscultation, breath sounds equal bilaterally, chest nontender. 

Normal work of breathing, no accessory muscles used.


Heart: S1S2, regular rate and rhythm without overt murmur


Abdomen: Soft, obese, epigastric tenderness into the right upper quadrant. 

Negative for masses or hepatosplenomegaly. Negative for costovertebral te

nderness.


Skin: Intact, warm, dry. No lesions or rashes noted.


Hematologic: No petechiae or purpra. Mucosa appropriate color and normal nail 

bed color and refill.


Extremities: Atraumatic, moves all extremities per self without difficulty or 

deficits, negative for cords or calf pain. Neurovascular unremarkable.


Neuro: Awake, alert, oriented. Cranial nerves II through XII unremarkable. 

Cerebellum unremarkable. Motor and sensory unremarkable throughout. Exam 

nonfocal.


Psychiatric: Mood and affect are appropriate.  Normal thought process. Answering

questions appropriately.





Notes:


Lab work is unremarkable.  Patient feels improved after IV fluids and 

medications.  At this time I do not feel he warrants any imaging.  I have talked

with the patient about today's findings, in addition to providing specific 

details for plan of care.  Reassessment at the time of disposition demonstrates 

that the patient is in no acute distress.  The patient is stable for discharge, 

counseling was provided and we discussed in great detail signs and symptoms that

would prompt them to return to the Emergency Department. Medication, follow up 

and supportive care measures were reviewed and discussed. Voices understanding 

and is agreeable to plan of care. Denies any further questions or concerns at 

this time.





Diagnostics:


CBC, CMP, Lipase, UA, CT abd/pelvis





Therapeutics:


IV fluids, Zofran, Dilaudid





Prescription:


None





Impression: 


Abdominal pain





Plan:


1. Today your lab work was within normal limits.  Elliott diet and advance as 

tolerated.  Please make sure you are drinking plenty of fluids. 


2.  You can alternate Tylenol and ibuprofen as needed for pain management.


3. We encourage you to follow up with your primary care provider and/or 

recommended specialist in the next few days for re-evaluation and further 

care/management. If your symptoms should worsen, new symptoms develop or any of 

the signs and symptoms we discussed should arise please return to the emergency 

room or call 911 (if needed).





Definitive disposition and diagnosis as appropriate pending reevaluation and 

review of above.





  ** Middle Abdomen


Pain Score (Numeric/FACES): 7





- Related Data


                                    Allergies











Allergy/AdvReac Type Severity Reaction Status Date / Time


 


avocado AdvReac  Abdominal Verified 12/11/20 18:00





   Pain  


 


honey AdvReac  Abdominal Verified 12/11/20 18:00





   Pain  











Home Meds: 


                                    Home Meds





Aspirin 325 mg PO DAILY 05/01/20 [History]


Metoprolol Succinate 25 mg PO DAILY 05/01/20 [History]


atorvaSTATin [Lipitor] 40 mg PO BID 05/01/20 [History]


gemfibroziL [Gemfibrozil] 600 mg PO BID 05/01/20 [History]











Past Medical History


HEENT History: Reports: None


Cardiovascular History: Reports: High Cholesterol, MI


Other Cardiovascular History: high triglycerides


Respiratory History: Reports: None


Gastrointestinal History: Reports: Pancreatitis


Other Gastrointestinal History: pancreatitis x9; has a familial hyperlipidemia 

which he states it is linked to the pancreatitis


Genitourinary History: Reports: None


Musculoskeletal History: Reports: None


Neurological History: Reports: None


Psychiatric History: Reports: None


Endocrine/Metabolic History: Reports: None


Hematologic History: Reports: None


Immunologic History: Reports: None


Oncologic (Cancer) History: Reports: None


Dermatologic History: Reports: None





- Infectious Disease History


Infectious Disease History: Reports: Chicken Pox





- Past Surgical History


Head Surgeries/Procedures: Reports: None


HEENT Surgical History: Reports: None


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


Other Cardiovascular Surgeries/Procedures: 4 vessels


Respiratory Surgical History: Reports: None


GI Surgical History: Reports: Cholecystectomy


Male  Surgical History: Reports: None


Endocrine Surgical History: Reports: None


Neurological Surgical History: Reports: None


Musculoskeletal Surgical History: Reports: None


Oncologic Surgical History: Reports: None


Dermatological Surgical History: Reports: None





Social & Family History





- Family History


Family Medical History: No Pertinent Family History





- Tobacco Use


Tobacco Use Status *Q: Never Tobacco User





- Caffeine Use


Caffeine Use: Reports: Energy Drinks


Caffeine Use Comment: hx of energy drink use





- Recreational Drug Use


Recreational Drug Use: No





ED ROS GENERAL





- Review of Systems


Review Of Systems: Comprehensive ROS is negative, except as noted in HPI.





ED EXAM, GI/ABD





- Physical Exam


Exam: See Below (See dictation)





Course





- Vital Signs


Last Recorded V/S: 


                                Last Vital Signs











Temp  96.8 F L  12/11/20 18:01


 


Pulse  87   12/11/20 18:01


 


Resp  16   12/11/20 18:01


 


BP  140/88   12/11/20 18:01


 


Pulse Ox  97   12/11/20 18:01














- Orders/Labs/Meds


Orders: 


                               Active Orders 24 hr











 Category Date Time Status


 


 Sodium Chloride 0.9% [Saline Flush] Med  12/11/20 17:59 Active





 10 ml FLUSH ASDIRECTED PRN   


 


 Sodium Chloride 0.9% [Saline Flush] Med  12/11/20 17:59 Active





 2.5 ml FLUSH ASDIRECTED PRN   


 


 Saline Lock Insert [OM.PC] Stat Oth  12/11/20 17:59 Ordered








                                Medication Orders





Sodium Chloride (Saline Flush)  10 ml FLUSH ASDIRECTED PRN


   PRN Reason: Keep Vein Open


   Last Admin: 12/11/20 18:24  Dose: 10 ml


   Documented by: ELLEN


Sodium Chloride (Saline Flush)  2.5 ml FLUSH ASDIRECTED PRN


   PRN Reason: Keep Vein Open


   Last Admin: 12/11/20 18:24  Dose: 2.5 ml


   Documented by: ELLEN








Labs: 


                                Laboratory Tests











  12/11/20 12/11/20 12/11/20 Range/Units





  18:06 18:42 18:42 


 


WBC   5.03   (4.0-11.0)  K/uL


 


RBC   4.40 L   (4.50-5.90)  M/uL


 


Hgb   13.6   (13.0-17.0)  g/dL


 


Hct   39.7   (38.0-50.0)  %


 


MCV   90.2   (80.0-98.0)  fL


 


MCH   30.9   (27.0-32.0)  pg


 


MCHC   34.3   (31.0-37.0)  g/dL


 


RDW Std Deviation   46.6   (28.0-62.0)  fl


 


RDW Coeff of Dajuan   14   (11.0-15.0)  %


 


Plt Count   175   (150-400)  K/uL


 


MPV   10.20   (7.40-12.00)  fL


 


Neut % (Auto)   57.8   (48.0-80.0)  %


 


Lymph % (Auto)   30.4   (16.0-40.0)  %


 


Mono % (Auto)   5.4   (0.0-15.0)  %


 


Eos % (Auto)   6.2   (0.0-7.0)  %


 


Baso % (Auto)   0.2   (0.0-1.5)  %


 


Neut # (Auto)   2.9   (1.4-5.7)  K/uL


 


Lymph # (Auto)   1.5   (0.6-2.4)  K/uL


 


Mono # (Auto)   0.3   (0.0-0.8)  K/uL


 


Eos # (Auto)   0.3   (0.0-0.7)  K/uL


 


Baso # (Auto)   0.0   (0.0-0.1)  K/uL


 


Nucleated RBC %   0.0   /100WBC


 


Nucleated RBCs #   0   K/uL


 


Sodium    136  (136-148)  mmol/L


 


Potassium    4.3  (3.5-5.1)  mmol/L


 


Chloride    103  ()  mmol/L


 


Carbon Dioxide    22.8  (21.0-32.0)  mmol/L


 


BUN    17  (7.0-18.0)  mg/dL


 


Creatinine    1.0  (0.8-1.3)  mg/dL


 


Est Cr Clr Drug Dosing    82.41  mL/min


 


Estimated GFR (MDRD)    > 60.0  ml/min


 


Glucose    120 H  ()  mg/dL


 


Calcium    8.5  (8.5-10.1)  mg/dL


 


Total Bilirubin    0.6  (0.2-1.0)  mg/dL


 


ALT    39  (14-63)  IU/L


 


Alkaline Phosphatase    60  ()  U/L


 


Total Protein    7.6  (6.4-8.2)  g/dL


 


Albumin    3.9  (3.4-5.0)  g/dL


 


Globulin    3.7  (2.6-4.0)  g/dL


 


Albumin/Globulin Ratio    1.1  (0.9-1.6)  


 


Lipase    110  ()  U/L


 


Urine Color  YELLOW    


 


Urine Appearance  CLEAR    


 


Urine pH  5.5    (5.0-8.0)  


 


Ur Specific Gravity  >= 1.030    (1.001-1.035)  


 


Urine Protein  NEGATIVE    (NEGATIVE)  mg/dL


 


Urine Glucose (UA)  NEGATIVE    (NEGATIVE)  mg/dL


 


Urine Ketones  NEGATIVE    (NEGATIVE)  mg/dL


 


Urine Occult Blood  TRACE-INTACT H    (NEGATIVE)  


 


Urine Nitrite  NEGATIVE    (NEGATIVE)  


 


Urine Bilirubin  NEGATIVE    (NEGATIVE)  


 


Urine Urobilinogen  0.2    (<2.0)  EU/dL


 


Ur Leukocyte Esterase  NEGATIVE    (NEGATIVE)  


 


Urine RBC  0-2    (0-2/HPF)  


 


Urine WBC  0-1    (0-5/HPF)  


 


Ur Epithelial Cells  RARE    (NONE-FEW)  


 


Urine Bacteria  RARE    (NEGATIVE)  











Meds: 


Medications











Generic Name Dose Route Start Last Admin





  Trade Name Freq  PRN Reason Stop Dose Admin


 


Sodium Chloride  10 ml  12/11/20 17:59  12/11/20 18:24





  Saline Flush  FLUSH   10 ml





  ASDIRECTED PRN   Administration





  Keep Vein Open  


 


Sodium Chloride  2.5 ml  12/11/20 17:59  12/11/20 18:24





  Saline Flush  FLUSH   2.5 ml





  ASDIRECTED PRN   Administration





  Keep Vein Open  














Discontinued Medications














Generic Name Dose Route Start Last Admin





  Trade Name Heath  PRN Reason Stop Dose Admin


 


Hydromorphone HCl  1 mg  12/11/20 18:12  12/11/20 18:25





  Dilaudid  IVPUSH  12/11/20 18:13  1 mg





  ONETIME ONE   Administration


 


Sodium Chloride  1,000 mls @ 999 mls/hr  12/11/20 17:58  12/11/20 18:24





  Normal Saline  IV  12/11/20 18:58  999 mls/hr





  STAT ONE   Administration


 


Ondansetron HCl  4 mg  12/11/20 18:12  12/11/20 18:25





  Zofran  IVPUSH  12/11/20 18:13  4 mg





  ONETIME ONE   Administration














Departure





- Departure


Time of Disposition: 19:58


Disposition: Home, Self-Care 01


Clinical Impression: 


Abdominal pain


Qualifiers:


 Abdominal location: epigastric Qualified Code(s): R10.13 - Epigastric pain








- Discharge Information


Instructions:  Abdominal Pain, Adult, Easy-to-Read


Referrals: 


PCP,None [Primary Care Provider] - 


Forms:  ED Department Discharge


Additional Instructions: 


The following information is given to patients seen in the emergency department 

who are being discharged to home. This information is to outline your options 

for follow-up care. We provide all patients seen in our emergency department 

with a follow-up referral.





The need for follow-up, as well as the timing and circumstances, are variable 

depending upon the specifics of your emergency department visit.





If you don't have a primary care physician on staff, we will provide you with a 

referral. We always advise you to contact your personal physician following an 

emergency department visit to inform them of the circumstance of the visit and 

for follow-up with them and/or the need for any referrals to a consulting 

specialist.





The emergency department will also refer you to a specialist when appropriate. 

This referral assures that you have the opportunity for follow-up care with a 

specialist. All of these measure are taken in an effort to provide you with 

optimal care, which includes your follow-up.





Under all circumstances we always encourage you to contact your private 

physician who remains a resource for coordinating your care. When calling for 

follow-up care, please make the office aware that this follow-up is from your 

recent emergency room visit. If for any reason you are refused follow-up, please

contact the  Emergency Department

at [phone] and asked to speak to the emergency department charge nurse.








Primary Care


[address] [place] [address]


Phone: [phone]


Fax: [phone]





[facility]


[address] [place] [address]


Phone: [phone]


Fax: [phone]





Thank you for choosing the [facility] emergency department in 

[place] for your medical needs today.  It was a pleasure caring for you. Today

you were seen in the emergency department for abdominal pain.





1. Today your lab work was within normal limits.  Elliott diet and advance as 

tolerated.  Please make sure you are drinking plenty of fluids. 


2.  You can alternate Tylenol and ibuprofen as needed for pain management.


3. We encourage you to follow up with your primary care provider and/or 

recommended specialist in the next few days for re-evaluation and further 

care/management. If your symptoms should worsen, new symptoms develop or any of 

the signs and symptoms we discussed should arise please return to the emergency 

room or call 911 (if needed).








Sepsis Event Note (ED)





- Evaluation


Sepsis Screening Result: No Definite Risk





- Focused Exam


Vital Signs: 


                                   Vital Signs











  Temp Pulse Resp BP Pulse Ox


 


 12/11/20 18:01  96.8 F L  87  16  140/88  97














- My Orders


Last 24 Hours: 


My Active Orders





12/11/20 17:59


Sodium Chloride 0.9% [Saline Flush]   10 ml FLUSH ASDIRECTED PRN 


Sodium Chloride 0.9% [Saline Flush]   2.5 ml FLUSH ASDIRECTED PRN 


Saline Lock Insert [OM.PC] Stat 














- Assessment/Plan


Last 24 Hours: 


My Active Orders





12/11/20 17:59


Sodium Chloride 0.9% [Saline Flush]   10 ml FLUSH ASDIRECTED PRN 


Sodium Chloride 0.9% [Saline Flush]   2.5 ml FLUSH ASDIRECTED PRN 


Saline Lock Insert [OM.PC] Stat

## 2021-01-18 ENCOUNTER — HOSPITAL ENCOUNTER (EMERGENCY)
Dept: HOSPITAL 56 - MW.ED | Age: 46
LOS: 1 days | Discharge: HOME | End: 2021-01-19
Payer: COMMERCIAL

## 2021-01-18 DIAGNOSIS — Z91.018: ICD-10-CM

## 2021-01-18 DIAGNOSIS — I25.2: ICD-10-CM

## 2021-01-18 DIAGNOSIS — E78.00: ICD-10-CM

## 2021-01-18 DIAGNOSIS — Z79.899: ICD-10-CM

## 2021-01-18 DIAGNOSIS — Z79.82: ICD-10-CM

## 2021-01-18 DIAGNOSIS — Z95.1: ICD-10-CM

## 2021-01-18 DIAGNOSIS — K29.70: Primary | ICD-10-CM

## 2021-01-18 DIAGNOSIS — Z20.822: ICD-10-CM

## 2021-01-18 LAB
BUN SERPL-MCNC: 13 MG/DL (ref 7–18)
CHLORIDE SERPL-SCNC: 104 MMOL/L (ref 98–107)
CO2 SERPL-SCNC: 24.9 MMOL/L (ref 21–32)
GLUCOSE SERPL-MCNC: 127 MG/DL (ref 74–106)
LIPASE SERPL-CCNC: 143 U/L (ref 73–393)
POTASSIUM SERPL-SCNC: 3.9 MMOL/L (ref 3.5–5.1)
SODIUM SERPL-SCNC: 139 MMOL/L (ref 136–148)

## 2021-01-18 PROCEDURE — 96375 TX/PRO/DX INJ NEW DRUG ADDON: CPT

## 2021-01-18 PROCEDURE — C9113 INJ PANTOPRAZOLE SODIUM, VIA: HCPCS

## 2021-01-18 PROCEDURE — 83690 ASSAY OF LIPASE: CPT

## 2021-01-18 PROCEDURE — 85025 COMPLETE CBC W/AUTO DIFF WBC: CPT

## 2021-01-18 PROCEDURE — 84484 ASSAY OF TROPONIN QUANT: CPT

## 2021-01-18 PROCEDURE — U0002 COVID-19 LAB TEST NON-CDC: HCPCS

## 2021-01-18 PROCEDURE — 99285 EMERGENCY DEPT VISIT HI MDM: CPT

## 2021-01-18 PROCEDURE — 80053 COMPREHEN METABOLIC PANEL: CPT

## 2021-01-18 PROCEDURE — 96374 THER/PROPH/DIAG INJ IV PUSH: CPT

## 2021-01-18 PROCEDURE — 36415 COLL VENOUS BLD VENIPUNCTURE: CPT

## 2021-01-18 PROCEDURE — 93005 ELECTROCARDIOGRAM TRACING: CPT

## 2021-01-18 PROCEDURE — 83735 ASSAY OF MAGNESIUM: CPT

## 2021-01-18 PROCEDURE — 87635 SARS-COV-2 COVID-19 AMP PRB: CPT

## 2021-01-18 PROCEDURE — 71045 X-RAY EXAM CHEST 1 VIEW: CPT

## 2021-01-18 RX ADMIN — NITROGLYCERIN PRN MG: 0.4 TABLET SUBLINGUAL at 22:50

## 2021-01-18 RX ADMIN — NITROGLYCERIN PRN MG: 0.4 TABLET SUBLINGUAL at 22:42

## 2021-01-18 NOTE — CR
HISTORY:



Gastric pain. 



COMPARISON:



None available. 



FINDINGS:



A portable erect AP view of the chest was obtained at 22 01 hours. 



There is mild linear density in the left lateral lung base, probably 

scarring from previous inflammatory disease. 



The lungs are otherwise clear. No focal or diffuse infiltrates are present. 

The heart is top-normal in size. 



There are sternal wires from median sternotomy. 



The mediastinum is otherwise normal in appearance. 



The osseous structures are otherwise normal in appearance for the patient`s 

age.



IMPRESSION:



No active disease seen in the chest.



Dictated by Kumar Sharp MD @ Jan 18 2021 10:21PM



Signed by Dr. Kumar Sharp @ Jan 18 2021 10:23PM

## 2021-01-18 NOTE — EDM.PDOC
ED HPI GENERAL MEDICAL PROBLEM





- General


Chief Complaint: Abdominal Pain


Stated Complaint: SICK


Time Seen by Provider: 01/18/21 21:23





- History of Present Illness


INITIAL COMMENTS - FREE TEXT/NARRATIVE: 





History of present illness:


[] The patient has epigastric pain.  It started 2 hours ago.  It is 10 out of 10

 in severity.  It does let up a little bit intermittently but then it recurs 

just as bad.  He is unable to tell how long it waxes and wanes.  He has had 

similar pain in the past.  He is not vomiting.  His bowels are normal.  His 

urine flow is normal.  He does not have a fever.  He does not have cough or 

other symptoms of upper respiratory infection.





Has a history of coronary  vessel bypass.  That was in 2018.  He had a visit 

pancreatitis in 2019 when his troponin was elevated but he did not know of any 

heart problem.  Transferred to [place] and they likely repeated his troponin 

and must not have gone up then because they would have told him about the heart 

problem.





Patient does not drink alcohol.  He has had his gallbladder removed.





Review of systems: 


As per history of present illness and below otherwise all systems reviewed and 

negative.





Past medical history: 


As per history of present illness and as reviewed below otherwise 

noncontributory.





Surgical history: 


As per history of present illness and as reviewed below otherwise 

noncontributory.





Social history: 


No reported history of drug or alcohol abuse.





Family history: 


As per history of present illness and as reviewed below otherwise 

noncontributory.





Physical exam:


Constitutional - well developed, well-nourished and in no acute distress


HEENT - normocephalic, no evidence of trauma - external nose and mouth normal - 

no mass in neck and no JVD - mucosae moist





EYES - full EOM, PERRL, no icterus - no evidence of inflammation, injection, or 

drainage





Respiratory - no respiratory distress, equal bilateral expansion, lungs clear to

 auscultation and no abnormal lung sounds





Cardiovascular - Regular Rhythm with S1 and S2 appreciated and no murmur, gallop

 or rub.





GI -tender epigastrium.  Abdomen soft without distension or organomegaly - 

normal bowel sounds - no guard or rebound





Musculoskeletal  no gross deformity of long bones or joints - no tenderness, 

swelling or edema





Neurologic - Alert and oriented times four - CN II-XII grossly intact - motor 

sensory and coordination symmetrically normal





Psychiatric - appropriate mood and affect with normal thought content





Hematologic - No petechiae or purpura - mucosa appropriate color and sclera not 

pale - normal nail bed color and refill





Integument - no rash or evidence of trauma - normal turgor





Diagnostics:


[]





Therapeutics:


[]





Impression: 


[]





Plan:


[]





Definitive disposition and diagnosis as appropriate pending reevaluation and 

review of above.








  ** Abdomen


Pain Score (Numeric/FACES): 10





- Related Data


                                    Allergies











Allergy/AdvReac Type Severity Reaction Status Date / Time


 


avocado AdvReac  Abdominal Verified 01/18/21 21:31





   Pain  


 


honey AdvReac  Abdominal Verified 01/18/21 21:31





   Pain  











Home Meds: 


                                    Home Meds





Aspirin 325 mg PO DAILY 05/01/20 [History]


Metoprolol Succinate 25 mg PO DAILY 05/01/20 [History]


atorvaSTATin [Lipitor] 80 mg PO DAILY 05/01/20 [History]


gemfibroziL [Gemfibrozil] 600 mg PO BID 05/01/20 [History]


Omega-3 Acid Ethyl Esters [Lovaza] 1 gm PO BID 01/18/21 [History]


Omeprazole Magnesium [Prilosec] 20 mg PO DAILY #60 tab 01/19/21 [Rx]











Past Medical History


HEENT History: Reports: None


Cardiovascular History: Reports: High Cholesterol, MI


Other Cardiovascular History: High triglycerides


Respiratory History: Reports: None


Gastrointestinal History: Reports: Pancreatitis


Other Gastrointestinal History: pancreatitis x9; has a familial hyperlipidemia 

which he states it is linked to the pancreatitis


Genitourinary History: Reports: None


Musculoskeletal History: Reports: None


Neurological History: Reports: None


Psychiatric History: Reports: None


Endocrine/Metabolic History: Reports: None


Insulin Pump Model and : None


Hematologic History: Reports: None


Immunologic History: Reports: None


Oncologic (Cancer) History: Reports: None


Dermatologic History: Reports: None





- Infectious Disease History


Infectious Disease History: Reports: Chicken Pox





- Past Surgical History


Head Surgeries/Procedures: Reports: None


HEENT Surgical History: Reports: None


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


Other Cardiovascular Surgeries/Procedures: 4 vessels


Respiratory Surgical History: Reports: None


GI Surgical History: Reports: Cholecystectomy


Male  Surgical History: Reports: None


Endocrine Surgical History: Reports: None


Neurological Surgical History: Reports: None


Musculoskeletal Surgical History: Reports: None


Oncologic Surgical History: Reports: None


Dermatological Surgical History: Reports: None





Social & Family History





- Family History


Family Medical History: No Pertinent Family History





- Caffeine Use


Caffeine Use: Reports: Energy Drinks


Caffeine Use Comment: hx of energy drink use





- Recreational Drug Use


Recreational Drug Use: No





ED ROS GENERAL





- Review of Systems


Review Of Systems: Comprehensive ROS is negative, except as noted in HPI.





ED EXAM, GENERAL





- Physical Exam


Exam: See Below


Free Text/Narrative:: 





My physical exam is in the HPI


  ** #1 Interpretation


EKG Interpretation Comments: 





EKG done at 2135 has a sinus rhythm with a heart rate of 91  axis XI and 

QT of 435.  ST elevation consistent with early repole in the precordium.  

Compared to 8/30/2018 there is no change except the PVCs are no longer present. 

 Impression no acute injury.





  ** #2 Interpretation


EKG Interpretation Comments: 





EKG repeated at 23 326 normal sinus rhythm heart rate 92   Axis XIII

 minimal ST elevation normal QRS.  Compared to the prior EKG there is no change 

impression no acute MI seen here.





Course





- Vital Signs


Text/Narrative:: 





2155 hrs.  The patient says Dilaudid helps his pain.  I reviewed the [facility] prescription monitoring program and he has had no recent 

narcotics.  He did have a prescription for 60 clonazepam tablets written on the 

14th of this month.  He had no other benzos on his record.  He said that was for

 anxiety.





2254 hrs.  The patient feels better after Dilaudid but not completely better.  

Blood pressure is elevated.  The patient has a positive troponin.  I reviewed 

the chart from 2019 and he had a positive troponin.  At that time he had a 

massively elevated lipase.  He was transferred to [place].  He says no one ever

 said anything about his heart so I can assume that his troponin did not elevate

 beyond that at that time.  However his lipase is normal now so this suggest 

that this is truly a heart because of troponin.





EKG without evidence of a STEMI although a little bit of ST elevation I will 

repeat EKG and a troponin and 90 minutes from the original.  Give a trial of 

nitroglycerin.  The patient is already had 325 mg of aspirin today.





2332 hrs. the patient had almost complete relief of his chest pain down to 2 out

 of 10 with 2 nitroglycerin.  Blood pressure is controlled.  EKG did not reveal 

any acute MI.  Second troponin pending.  Disposition will be based on this.








This patient was seen and evaluated during the 2020 SARS-CoV-2 novel coronavirus

 pandemic period.  Community viral transmission is ongoing at time of this 

encounter and the emergency department is operating under pandemic response 

procedures.





Due to a high probability of clinically significant, life threatening 

deterioration, the patient required my highest level of preparedness to 

intervene emergently and I personally spent this critical care time directly and

 personally managing the patient. This critical care time included obtaining a 

history; examining the patient; pulse oximetry; ordering and review of studies; 

arranging urgent treatment with development of a management plan; evaluation of 

patient's response to treatment; frequent reassessment; and, discussions with 

other providers.


This critical care time was performed to assess and manage the high probability 

of imminent, life-threatening deterioration that could result in multi-organ 

failure. It was exclusive of separately billable procedures and treating other 

patients and teaching time.





12:30 AM patient feels better discharged in satisfactory condition he is to 

discuss with his cardiologist what to do about his chronically elevated troponin

 I.


Last Recorded V/S: 


                                Last Vital Signs











Temp  36.1 C   01/18/21 22:50


 


Pulse  82   01/18/21 22:50


 


Resp  18   01/18/21 22:50


 


BP  113/76   01/18/21 23:00


 


Pulse Ox  95   01/18/21 22:50














- Orders/Labs/Meds


Orders: 


                               Active Orders 24 hr











 Category Date Time Status


 


 EKG 12 Lead [EKG Documentation Completion] [RC] ONETIME Care  01/18/21 23:19 

Active


 


 EKG 12 Lead [EKG Documentation Completion] [RC] STAT Care  01/18/21 21:43 

Active


 


 Nitroglycerin [Nitrostat] Med  01/18/21 22:38 Active





 0.4 mg SL Q5M PRN   


 


 Sodium Chloride 0.9% [Saline Flush] Med  01/18/21 21:51 Active





 10 ml FLUSH ASDIRECTED PRN   


 


 Sodium Chloride 0.9% [Saline Flush] Med  01/18/21 21:51 Active





 2.5 ml FLUSH ASDIRECTED PRN   


 


 Saline Lock Insert [OM.PC] Stat Oth  01/18/21 21:51 Ordered








                                Medication Orders





Nitroglycerin (Nitrostat)  0.4 mg SL Q5M PRN


   PRN Reason: Chest Pain


   Last Admin: 01/18/21 22:50  Dose: 0.4 mg


   Documented by: CARINE


   Admin: 01/18/21 22:42  Dose: 0.4 mg


   Documented by: CARINE


Sodium Chloride (Saline Flush)  10 ml FLUSH ASDIRECTED PRN


   PRN Reason: Keep Vein Open


Sodium Chloride (Saline Flush)  2.5 ml FLUSH ASDIRECTED PRN


   PRN Reason: Keep Vein Open








Labs: 


                                Laboratory Tests











  01/18/21 01/18/21 01/18/21 Range/Units





  21:49 21:49 22:42 


 


WBC  5.41    (4.0-11.0)  K/uL


 


RBC  4.92    (4.50-5.90)  M/uL


 


Hgb  15.1    (13.0-17.0)  g/dL


 


Hct  44.5    (38.0-50.0)  %


 


MCV  90.4    (80.0-98.0)  fL


 


MCH  30.7    (27.0-32.0)  pg


 


MCHC  33.9    (31.0-37.0)  g/dL


 


RDW Std Deviation  46.1    (28.0-62.0)  fl


 


RDW Coeff of Dajuan  14    (11.0-15.0)  %


 


Plt Count  224    (150-400)  K/uL


 


MPV  10.70    (7.40-12.00)  fL


 


Neut % (Auto)  60.6    (48.0-80.0)  %


 


Lymph % (Auto)  29.0    (16.0-40.0)  %


 


Mono % (Auto)  4.8    (0.0-15.0)  %


 


Eos % (Auto)  5.2    (0.0-7.0)  %


 


Baso % (Auto)  0.4    (0.0-1.5)  %


 


Neut # (Auto)  3.3    (1.4-5.7)  K/uL


 


Lymph # (Auto)  1.6    (0.6-2.4)  K/uL


 


Mono # (Auto)  0.3    (0.0-0.8)  K/uL


 


Eos # (Auto)  0.3    (0.0-0.7)  K/uL


 


Baso # (Auto)  0.0    (0.0-0.1)  K/uL


 


Nucleated RBC %  0.0    /100WBC


 


Nucleated RBCs #  0    K/uL


 


Sodium   139   (136-148)  mmol/L


 


Potassium   3.9   (3.5-5.1)  mmol/L


 


Chloride   104   ()  mmol/L


 


Carbon Dioxide   24.9   (21.0-32.0)  mmol/L


 


BUN   13   (7.0-18.0)  mg/dL


 


Creatinine   1.1   (0.8-1.3)  mg/dL


 


Est Cr Clr Drug Dosing   74.92   mL/min


 


Estimated GFR (MDRD)   > 60.0   ml/min


 


Glucose   127 H   ()  mg/dL


 


Calcium   9.4   (8.5-10.1)  mg/dL


 


Magnesium   2.1   (1.8-2.4)  mg/dL


 


Total Bilirubin   0.6   (0.2-1.0)  mg/dL


 


AST   37   (15-37)  IU/L


 


ALT   38   (14-63)  IU/L


 


Alkaline Phosphatase   67   ()  U/L


 


Troponin I   0.141 H*   (0.000-0.056)  ng/mL


 


Total Protein   8.0   (6.4-8.2)  g/dL


 


Albumin   4.1   (3.4-5.0)  g/dL


 


Globulin   3.9   (2.6-4.0)  g/dL


 


Albumin/Globulin Ratio   1.1   (0.9-1.6)  


 


Lipase   143   ()  U/L


 


SARS-CoV-2 RNA (MAVIS)    NEGATIVE  (NEGATIVE)  














  01/18/21 Range/Units





  23:21 


 


WBC   (4.0-11.0)  K/uL


 


RBC   (4.50-5.90)  M/uL


 


Hgb   (13.0-17.0)  g/dL


 


Hct   (38.0-50.0)  %


 


MCV   (80.0-98.0)  fL


 


MCH   (27.0-32.0)  pg


 


MCHC   (31.0-37.0)  g/dL


 


RDW Std Deviation   (28.0-62.0)  fl


 


RDW Coeff of Dajuan   (11.0-15.0)  %


 


Plt Count   (150-400)  K/uL


 


MPV   (7.40-12.00)  fL


 


Neut % (Auto)   (48.0-80.0)  %


 


Lymph % (Auto)   (16.0-40.0)  %


 


Mono % (Auto)   (0.0-15.0)  %


 


Eos % (Auto)   (0.0-7.0)  %


 


Baso % (Auto)   (0.0-1.5)  %


 


Neut # (Auto)   (1.4-5.7)  K/uL


 


Lymph # (Auto)   (0.6-2.4)  K/uL


 


Mono # (Auto)   (0.0-0.8)  K/uL


 


Eos # (Auto)   (0.0-0.7)  K/uL


 


Baso # (Auto)   (0.0-0.1)  K/uL


 


Nucleated RBC %   /100WBC


 


Nucleated RBCs #   K/uL


 


Sodium   (136-148)  mmol/L


 


Potassium   (3.5-5.1)  mmol/L


 


Chloride   ()  mmol/L


 


Carbon Dioxide   (21.0-32.0)  mmol/L


 


BUN   (7.0-18.0)  mg/dL


 


Creatinine   (0.8-1.3)  mg/dL


 


Est Cr Clr Drug Dosing   mL/min


 


Estimated GFR (MDRD)   ml/min


 


Glucose   ()  mg/dL


 


Calcium   (8.5-10.1)  mg/dL


 


Magnesium   (1.8-2.4)  mg/dL


 


Total Bilirubin   (0.2-1.0)  mg/dL


 


AST   (15-37)  IU/L


 


ALT   (14-63)  IU/L


 


Alkaline Phosphatase   ()  U/L


 


Troponin I  0.136 H*  (0.000-0.056)  ng/mL


 


Total Protein   (6.4-8.2)  g/dL


 


Albumin   (3.4-5.0)  g/dL


 


Globulin   (2.6-4.0)  g/dL


 


Albumin/Globulin Ratio   (0.9-1.6)  


 


Lipase   ()  U/L


 


SARS-CoV-2 RNA (MAVIS)   (NEGATIVE)  











Meds: 


Medications











Generic Name Dose Route Start Last Admin





  Trade Name Freq  PRN Reason Stop Dose Admin


 


Nitroglycerin  0.4 mg  01/18/21 22:38  01/18/21 22:50





  Nitrostat  SL   0.4 mg





  Q5M PRN   Administration





  Chest Pain  


 


Sodium Chloride  10 ml  01/18/21 21:51 





  Saline Flush  FLUSH  





  ASDIRECTED PRN  





  Keep Vein Open  


 


Sodium Chloride  2.5 ml  01/18/21 21:51 





  Saline Flush  FLUSH  





  ASDIRECTED PRN  





  Keep Vein Open  














Discontinued Medications














Generic Name Dose Route Start Last Admin





  Trade Name Freq  PRN Reason Stop Dose Admin


 


Al Hydroxide/Mg Hydroxide 15  0 ml  01/18/21 23:57  01/19/21 00:07





ml/ Metoclopramide HCl 5 mg/  PO  01/18/21 23:58  1 each





Lidocaine HCl 5 ml  ONETIME ONE   Administration


 


Hydromorphone HCl  1 mg  01/18/21 21:51  01/18/21 21:56





  Dilaudid  IVPUSH  01/18/21 21:52  1 mg





  ONETIME ONE   Administration


 


Sodium Chloride  1,000 mls @ 999 mls/hr  01/18/21 21:53  01/18/21 21:56





  Normal Saline  IV  01/18/21 22:53  999 mls/hr





  .Bolus ONE   Administration


 


Pantoprazole Sodium 40 mg/  10 mls @ 300 mls/hr  01/18/21 23:57  01/19/21 00:07





  Sodium Chloride  IV  01/18/21 23:58  300 mls/hr





  NOW ONE   Administration


 


Nitroglycerin  Confirm  01/18/21 22:38  01/18/21 22:43





  Nitrostat  Administered  01/18/21 22:39  Not Given





  Dose  





  1.2 mg  





  .ROUTE  





  .STK-MED ONE  


 


Ondansetron HCl  4 mg  01/18/21 21:51  01/18/21 21:56





  Zofran  IVPUSH  01/18/21 21:52  4 mg





  ONETIME ONE   Administration














Departure





- Departure


Time of Disposition: 00:31


Disposition: Home, Self-Care 01


Clinical Impression: 


 Gastritis








- Discharge Information


Instructions:  Gastritis, Adult, Easy-to-Read


Referrals: 


Charly Ordaz MD [Primary Care Provider] - 


Forms:  ED Department Discharge


Additional Instructions: 


Tell your cardiologist you have had elevated troponins on 2 visits in the last 2

years.  Tonight it went down instead of up and was pretty stable.





If you have worse pain or continued pain come back





Since you need the aspirin and you apparently have irritation of your stomach 

you should take the medication which can be purchased over-the-counter is fine 

pantoprazole or omeprazole or fill the prescription.








[facility]


[address] [place] [id number]


Phone: [phone]


Fax: [phone]








[address] [address]


Phone: [phone]


Fax: [phone]





The following information is given to patients seen in the emergency department 

who are being discharged to home. This information is to outline your options 

for follow-up care. We provide all patients seen in our emergency department 

with a follow-up referral.





The need for follow-up, as well as the timing and circumstances, are variable d

epending upon the specifics of your emergency department visit.





If you don't have a primary care physician on staff, we will provide you with a 

referral. We always advise you to contact your personal physician following an 

emergency department visit to inform them of the circumstance of the visit and 

for follow-up with them and/or the need for any referrals to a consulting 

specialist.





The emergency department will also refer you to a specialist when appropriate. 

This referral assures that you have the opportunity for follow-up care with a 

specialist. All of these measure are taken in an effort to provide you with 

optimal care, which includes your follow-up.





Under all circumstances we always encourage you to contact your private 

physician who remains a resource for coordinating your care. When calling for 

follow-up care, please make the office aware that this follow-up is from your 

recent emergency room visit. If for any reason you are refused follow-up, please

contact the [facility] Emergency Department

at [phone] and asked to speak to the emergency department charge nurse.








Sepsis Event Note (ED)





- Evaluation


Sepsis Screening Result: No Definite Risk





- Focused Exam


Vital Signs: 


                                   Vital Signs











  Temp Pulse Resp BP BP Pulse Ox


 


 01/18/21 23:00      113/76 


 


 01/18/21 22:50  36.1 C  82  18  124/79   95


 


 01/18/21 22:42     139/90  


 


 01/18/21 21:25  36.1 C  94  18   151/91 H  97














- My Orders


Last 24 Hours: 


My Active Orders





01/18/21 21:43


EKG 12 Lead [EKG Documentation Completion] [RC] STAT 





01/18/21 21:51


Sodium Chloride 0.9% [Saline Flush]   10 ml FLUSH ASDIRECTED PRN 


Sodium Chloride 0.9% [Saline Flush]   2.5 ml FLUSH ASDIRECTED PRN 


Saline Lock Insert [OM.PC] Stat 





01/18/21 22:38


Nitroglycerin [Nitrostat]   0.4 mg SL Q5M PRN 





01/18/21 23:19


EKG 12 Lead [EKG Documentation Completion] [RC] ONETIME 














- Assessment/Plan


Last 24 Hours: 


My Active Orders





01/18/21 21:43


EKG 12 Lead [EKG Documentation Completion] [RC] STAT 





01/18/21 21:51


Sodium Chloride 0.9% [Saline Flush]   10 ml FLUSH ASDIRECTED PRN 


Sodium Chloride 0.9% [Saline Flush]   2.5 ml FLUSH ASDIRECTED PRN 


Saline Lock Insert [OM.PC] Stat 





01/18/21 22:38


Nitroglycerin [Nitrostat]   0.4 mg SL Q5M PRN 





01/18/21 23:19


EKG 12 Lead [EKG Documentation Completion] [RC] ONETIME

## 2021-07-18 ENCOUNTER — HOSPITAL ENCOUNTER (EMERGENCY)
Dept: HOSPITAL 56 - MW.ED | Age: 46
Discharge: HOME | End: 2021-07-18
Payer: COMMERCIAL

## 2021-07-18 DIAGNOSIS — Z79.82: ICD-10-CM

## 2021-07-18 DIAGNOSIS — Z79.899: ICD-10-CM

## 2021-07-18 DIAGNOSIS — I25.2: ICD-10-CM

## 2021-07-18 DIAGNOSIS — Z88.8: ICD-10-CM

## 2021-07-18 DIAGNOSIS — Z91.030: ICD-10-CM

## 2021-07-18 DIAGNOSIS — E78.5: ICD-10-CM

## 2021-07-18 DIAGNOSIS — E78.00: ICD-10-CM

## 2021-07-18 DIAGNOSIS — R10.13: Primary | ICD-10-CM

## 2021-07-18 LAB
BUN SERPL-MCNC: 12 MG/DL (ref 7–18)
CHLORIDE SERPL-SCNC: 103 MMOL/L (ref 98–107)
CO2 SERPL-SCNC: 25.2 MMOL/L (ref 21–32)
GLUCOSE SERPL-MCNC: 110 MG/DL (ref 74–106)
LIPASE SERPL-CCNC: 94 U/L (ref 73–393)
POTASSIUM SERPL-SCNC: 4.1 MMOL/L (ref 3.5–5.1)
SODIUM SERPL-SCNC: 140 MMOL/L (ref 136–148)

## 2021-07-18 PROCEDURE — 96375 TX/PRO/DX INJ NEW DRUG ADDON: CPT

## 2021-07-18 PROCEDURE — 96374 THER/PROPH/DIAG INJ IV PUSH: CPT

## 2021-07-18 PROCEDURE — 36415 COLL VENOUS BLD VENIPUNCTURE: CPT

## 2021-07-18 PROCEDURE — 84484 ASSAY OF TROPONIN QUANT: CPT

## 2021-07-18 PROCEDURE — 83690 ASSAY OF LIPASE: CPT

## 2021-07-18 PROCEDURE — 80053 COMPREHEN METABOLIC PANEL: CPT

## 2021-07-18 PROCEDURE — 85025 COMPLETE CBC W/AUTO DIFF WBC: CPT

## 2021-07-18 PROCEDURE — 96376 TX/PRO/DX INJ SAME DRUG ADON: CPT

## 2021-07-18 PROCEDURE — 80061 LIPID PANEL: CPT

## 2021-07-18 PROCEDURE — 99284 EMERGENCY DEPT VISIT MOD MDM: CPT

## 2021-07-18 NOTE — EDM.PDOC
ED HPI GENERAL MEDICAL PROBLEM





- General


Chief Complaint: Abdominal Pain


Stated Complaint: UPPER STOMACH PAIN


Time Seen by Provider: 07/18/21 01:32





- History of Present Illness


INITIAL COMMENTS - FREE TEXT/NARRATIVE: 





HISTORY AND PHYSICAL:





History of present illness:


This is a 46-year-old gentleman with a history significant for hypertension, 

coronary disease, status post CABG, hyper triglyceridemia, hyperlipidemia 

induced pancreatitis, who presents to the ER today secondary to midepigastric 

abdominal pain consistent with his prior pancreatitis pain.  Patient denies any 

recent fevers, shakes, chills.  Patient reports nausea with no vomiting or 

diarrhea.  Patient has a dysuria, frequency, urgency, hematuria.  Patient denies

any melena or bright red blood per rectum.  Patient denies any exertional 

component to the discomfort.  Patient denies any rest component to the 

discomfort.  Patient reports that he has had flareups like this in the past and 

is usually secondary to his diet.





Review of systems: 


As per history of present illness and below otherwise all systems reviewed and 

negative.





Past medical history: 


As per history of present illness and as reviewed below otherwise 

noncontributory.





Surgical history: 


As per history of present illness and as reviewed below otherwise 

noncontributory.





Social history: 


No reported history of drug abuse.





Family history: 


As per history of present illness and as reviewed below otherwise 

noncontributory.





Physical exam:


This patient was seen and evaluated during the 2020 SARS-CoV-2 novel coronavirus

pandemic period.  Community viral transmission is ongoing at time of this 

encounter and the emergency department is operating under pandemic response 

procedures.





Constitutional: Patient is oriented to person, place, and time.  Appears well-

developed and well-nourished.  No distress.


 HEENT: Moist mucous membranes


 Head: Normocephalic and atraumatic


 Eyes: Right eye exhibits no discharge.  Left eye exhibits no discharge.  No 

scleral icterus


 Neck: Normal range of motion.  No tracheal deviation present.


 Cardiovascular: Normal rate and regular rhythm.


 Pulmonary: Effort normal, no respiratory distress.


Abd:  Soft, nondistended, no rebound/guarding, no psoas or obturator signs, no 

tenderness at Mcberney's point, no Curry's sign.  Pt does not present with an 

exam that would be consistent with an acute surgical abdomen at this time.  

Tenderness to palpation midepigastric region.


 Musculoskeletal: Normal range of motion


 Neurologic: Alert and oriented to person, place and time.


 Skin: Pink, warm and dry.  


 Psychiatric: Normal mood and affect.  Behavior is normal.  Judgment and thought

content normal.


 Nursing note and vital signs have been reviewed





Diagnostics:


CBC, CMP, lipase within normal limits





EKG:


As interpreted by ER physician: Arline:


Nonspecific ST-T wave abnormalities


Normal axis


No evidence of ST elevation MI


Normal sinus rhythm heart rate of 85





Therapeutics:


Zofran, fentanyl, NSS





Assessment and plan:


This is a 46-year-old gentleman who presents to the ER today secondary to 

abdominal pain that he feels is typical of his pancreatitis pain.  Patient 

reports that the pain is completely to similar to his prior heart disease pain. 

Patient denies any pain to his jaw, back, arms.  Patient denies any diaphoresis,

shortness of breath.  Patient reports pain is not exertional nor relieved with 

rest.





While in the ED, the patient received fentanyl and Zofran to assist with his 

pain which improved significantly.  Patient's labs are all within normal limits 

except for his elevated lipids.  Patient is supposed to follow-up for plasma

pheresis but has not done so as of yet for his hyper lipidemia.  At this time, 

the patient does feel better and feels comfortable with the plan to be 

discharged home.  Patient has requested a dose of pain medicines prior to 

discharge and will be discharged home with a prescription for Zofran and Ultram.





Reassessment at the time of disposition demonstrates that the patient is in no 

acute distress.  The patient has remained stable throughout the entire ED visit 

and is without objective evidence for acute process requiring urgent 

intervention or hospitalization. The patient is stable for discharge, counseling

is provided as documented above, discussed symptomatic treatment and specific 

conditions for return.





I have spoken with the patient/caregiver and discussed todays findings, in 

addition to providing specific details for the plan of care. Questions are 

answered and there is agreement with the plan.





Definitive disposition and diagnosis as appropriate pending reevaluation and 

review of above.














- Related Data


                                    Allergies











Allergy/AdvReac Type Severity Reaction Status Date / Time


 


avocado AdvReac  Abdominal Verified 07/18/21 01:50





   Pain  


 


honey AdvReac  Abdominal Verified 07/18/21 01:50





   Pain  











Home Meds: 


                                    Home Meds





Aspirin 325 mg PO DAILY 05/01/20 [History]


Metoprolol Succinate 25 mg PO DAILY 05/01/20 [History]


atorvaSTATin [Lipitor] 80 mg PO DAILY 05/01/20 [History]


gemfibroziL [Gemfibrozil] 600 mg PO BID 05/01/20 [History]


Omega-3 Acid Ethyl Esters [Lovaza] 1 gm PO BID 01/18/21 [History]


Omeprazole Magnesium [Prilosec] 20 mg PO DAILY #60 tab 01/19/21 [Rx]


Ondansetron [Zofran ODT] 4 mg PO Q6H PRN #12 tab.dis 07/18/21 [Rx]


traMADol [Ultram] 50 mg PO Q6H PRN #12 tab 07/18/21 [Rx]











Past Medical History


HEENT History: Reports: None


Cardiovascular History: Reports: High Cholesterol, MI


Other Cardiovascular History: familial hypercholesterolemia


Respiratory History: Reports: None


Gastrointestinal History: Reports: Pancreatitis


Other Gastrointestinal History: pancreatitis x9; has a familial hyperlipidemia 

which he states it is linked to the pancreatitis


Genitourinary History: Reports: None


Musculoskeletal History: Reports: None


Neurological History: Reports: None


Psychiatric History: Reports: None


Endocrine/Metabolic History: Reports: None


Insulin Pump Model and : None


Hematologic History: Reports: None


Immunologic History: Reports: None


Oncologic (Cancer) History: Reports: None


Dermatologic History: Reports: None





- Infectious Disease History


Infectious Disease History: Reports: Chicken Pox





- Past Surgical History


Head Surgeries/Procedures: Reports: None


HEENT Surgical History: Reports: None


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


Other Cardiovascular Surgeries/Procedures: 4 vessels


Respiratory Surgical History: Reports: None


GI Surgical History: Reports: Cholecystectomy


Male  Surgical History: Reports: None


Endocrine Surgical History: Reports: None


Neurological Surgical History: Reports: None


Musculoskeletal Surgical History: Reports: None


Oncologic Surgical History: Reports: None


Dermatological Surgical History: Reports: None





Social & Family History





- Family History


Family Medical History: No Pertinent Family History





- Tobacco Use


Tobacco Use Status *Q: Never Tobacco User





- Caffeine Use


Caffeine Use: Reports: Energy Drinks


Caffeine Use Comment: hx of energy drink use





- Recreational Drug Use


Recreational Drug Use: No





ED ROS GENERAL





- Review of Systems


Review Of Systems: See Below





ED EXAM, GENERAL





- Physical Exam


Exam: See Below





Course





- Vital Signs


Last Recorded V/S: 





                                Last Vital Signs











Temp  97.2 F   07/18/21 01:42


 


Pulse  72   07/18/21 01:42


 


Resp  20   07/18/21 01:42


 


BP  139/90   07/18/21 01:42


 


Pulse Ox  98   07/18/21 01:42














- Orders/Labs/Meds


Orders: 





                               Active Orders 24 hr











 Category Date Time Status


 


 EKG Documentation Completion [RC] AM Care  07/18/21 01:44 Active


 


 Sodium Chloride 0.9% [Saline Flush] Med  07/18/21 01:44 Active





 10 ml FLUSH ASDIRECTED PRN   


 


 Sodium Chloride 0.9% [Saline Flush] Med  07/18/21 01:44 Active





 2.5 ml FLUSH ASDIRECTED PRN   


 


 Saline Lock Insert [OM.PC] Stat Oth  07/18/21 01:44 Ordered








                                Medication Orders





Sodium Chloride (Sodium Chloride 0.9% 10 Ml Syringe)  10 ml FLUSH ASDIRECTED PRN


   PRN Reason: Keep Vein Open


Sodium Chloride (Sodium Chloride 0.9% 2.5 Ml Syringe)  2.5 ml FLUSH ASDIRECTED 

PRN


   PRN Reason: Keep Vein Open








Labs: 





                                Laboratory Tests











  07/18/21 07/18/21 07/18/21 Range/Units





  01:24 01:24 01:24 


 


WBC  5.32    (4.0-11.0)  K/uL


 


RBC  4.44 L    (4.50-5.90)  M/uL


 


Hgb  13.7    (13.0-17.0)  g/dL


 


Hct  39.2    (38.0-50.0)  %


 


MCV  88.3    (80.0-98.0)  fL


 


MCH  30.9    (27.0-32.0)  pg


 


MCHC  34.9    (31.0-37.0)  g/dL


 


RDW Std Deviation  45.7    (28.0-62.0)  fl


 


RDW Coeff of Dajuan  14    (11.0-15.0)  %


 


Plt Count  216    (150-400)  K/uL


 


MPV  10.20    (7.40-12.00)  fL


 


Neut % (Auto)  56.9    (48.0-80.0)  %


 


Lymph % (Auto)  30.1    (16.0-40.0)  %


 


Mono % (Auto)  8.1    (0.0-15.0)  %


 


Eos % (Auto)  4.7    (0.0-7.0)  %


 


Baso % (Auto)  0.2    (0.0-1.5)  %


 


Neut # (Auto)  3.0    (1.4-5.7)  K/uL


 


Lymph # (Auto)  1.6    (0.6-2.4)  K/uL


 


Mono # (Auto)  0.4    (0.0-0.8)  K/uL


 


Eos # (Auto)  0.3    (0.0-0.7)  K/uL


 


Baso # (Auto)  0.0    (0.0-0.1)  K/uL


 


Sodium   140   (136-148)  mmol/L


 


Potassium   4.1   (3.5-5.1)  mmol/L


 


Chloride   103   ()  mmol/L


 


Carbon Dioxide   25.2   (21.0-32.0)  mmol/L


 


BUN   12   (7.0-18.0)  mg/dL


 


Creatinine   1.1   (0.8-1.3)  mg/dL


 


Est Cr Clr Drug Dosing   TNP   


 


Estimated GFR (MDRD)   > 60.0   ml/min


 


Glucose   110 H   ()  mg/dL


 


Calcium   8.5   (8.5-10.1)  mg/dL


 


Total Bilirubin   0.6   (0.2-1.0)  mg/dL


 


AST   33   (15-37)  IU/L


 


ALT   43   (14-63)  IU/L


 


Alkaline Phosphatase   67   ()  U/L


 


Troponin I   < 0.050   (0.000-0.056)  ng/mL


 


Total Protein   7.3   (6.4-8.2)  g/dL


 


Albumin   4.0   (3.4-5.0)  g/dL


 


Globulin   3.3   (2.6-4.0)  g/dL


 


Albumin/Globulin Ratio   1.2   (0.9-1.6)  


 


Triglycerides    1505 H  (0-200)  mg/dL


 


Cholesterol    248 H  ()  mg/dL


 


HDL Cholesterol    28 L  (40-60)  mg/dL


 


Cholesterol/HDL Ratio    8.9 H  (3.3-6.0)  


 


Lipase   94   ()  U/L











Meds: 





Medications











Generic Name Dose Route Start Last Admin





  Trade Name Freq  PRN Reason Stop Dose Admin


 


Sodium Chloride  10 ml  07/18/21 01:44 





  Sodium Chloride 0.9% 10 Ml Syringe  FLUSH  





  ASDIRECTED PRN  





  Keep Vein Open  


 


Sodium Chloride  2.5 ml  07/18/21 01:44 





  Sodium Chloride 0.9% 2.5 Ml Syringe  FLUSH  





  ASDIRECTED PRN  





  Keep Vein Open  














Discontinued Medications














Generic Name Dose Route Start Last Admin





  Trade Name Heath  PRN Reason Stop Dose Admin


 


Fentanyl  100 mcg  07/18/21 01:44  07/18/21 01:57





  Fentanyl 50 Mcg/Ml Sdv  IVPUSH  07/18/21 01:45  100 mcg





  ONETIME ONE   Administration


 


Sodium Chloride  1,000 mls @ 999 mls/hr  07/18/21 01:44  07/18/21 01:59





  Normal Saline  IV  07/18/21 02:44  999 mls/hr





  .Bolus ONE   Administration


 


Ondansetron HCl  4 mg  07/18/21 01:44  07/18/21 01:57





  Ondansetron 4 Mg/2 Ml Sdv  IVPUSH  07/18/21 01:45  4 mg





  ONETIME ONE   Administration














Departure





- Departure


Time of Disposition: 03:49


Disposition: Home, Self-Care 01


Condition: Good


Clinical Impression: 


Abdominal pain


Qualifiers:


 Abdominal location: epigastric Qualified Code(s): R10.13 - Epigastric pain





Hyperlipidemia


Qualifiers:


 Hyperlipidemia type: unspecified Qualified Code(s): E78.5 - Hyperlipidemia, 

unspecified








- Discharge Information


Instructions:  Abdominal Pain, Adult, Easy-to-Read


Referrals: 


Charly Ordaz MD [Primary Care Provider] - 


Additional Instructions: 


You were seen and evaluated in ER today secondary to abdominal pain that was 

consistent with your prior episodes of pancreatitis.  Your labs were all within 

normal limits except for your elevated lipid levels.  You will be given a 

prescription for Zofran and Ultram to help with your pain and nausea until you 

are able to see your family doctor in the next 1 to 2 days.  Please return the 

ER if you have any new or concerning symptoms.





The following information is given to patients seen in the emergency department 

who are being discharged to home. This information is to outline your options 

for follow-up care. We provide all patients seen in our emergency department 

with a follow-up referral.





The need for follow-up, as well as the timing and circumstances, are variable 

depending upon the specifics of your emergency department visit.





If you don't have a primary care physician on staff, we will provide you with a 

referral. We always advise you to contact your personal physician following an 

emergency department visit to inform them of the circumstance of the visit and 

for follow-up with them and/or the need for any referrals to a consulting 

specialist.





The emergency department will also refer you to a specialist when appropriate. 

This referral assures that you have the opportunity for follow-up care with a 

specialist. All of these measure are taken in an effort to provide you with 

optimal care, which includes your follow-up.





Under all circumstances we always encourage you to contact your private 

physician who remains a resource for coordinating your care. When calling for 

follow-up care, please make the office aware that this follow-up is from your 

recent emergency room visit. If for any reason you are refused follow-up, please

contact the [facility] Emergency Department

at [phone] and asked to speak to the emergency department charge nurse.





[facility]


[address] [place] [id number]


Phone: [phone]


Fax: [phone]








[facility]


[address] [place] [address]


Phone: [phone]


Fax: [phone]








Sepsis Event Note (ED)





- Evaluation


Sepsis Screening Result: No Definite Risk





- Focused Exam


Vital Signs: 





                                   Vital Signs











  Temp Pulse Resp BP Pulse Ox


 


 07/18/21 01:42  97.2 F  72  20  139/90  98














- My Orders


Last 24 Hours: 





My Active Orders





07/18/21 01:44


EKG Documentation Completion [RC] AM 


Sodium Chloride 0.9% [Saline Flush]   10 ml FLUSH ASDIRECTED PRN 


Sodium Chloride 0.9% [Saline Flush]   2.5 ml FLUSH ASDIRECTED PRN 


Saline Lock Insert [OM.PC] Stat 














- Assessment/Plan


Last 24 Hours: 





My Active Orders





07/18/21 01:44


EKG Documentation Completion [RC] AM 


Sodium Chloride 0.9% [Saline Flush]   10 ml FLUSH ASDIRECTED PRN 


Sodium Chloride 0.9% [Saline Flush]   2.5 ml FLUSH ASDIRECTED PRN 


Saline Lock Insert [OM.PC] Stat

## 2021-10-16 ENCOUNTER — HOSPITAL ENCOUNTER (EMERGENCY)
Dept: HOSPITAL 56 - MW.ED | Age: 46
Discharge: HOME | End: 2021-10-16
Payer: MEDICAID

## 2021-10-16 DIAGNOSIS — Z79.82: ICD-10-CM

## 2021-10-16 DIAGNOSIS — Z91.018: ICD-10-CM

## 2021-10-16 DIAGNOSIS — U07.1: Primary | ICD-10-CM

## 2021-10-16 DIAGNOSIS — E78.00: ICD-10-CM

## 2021-10-16 DIAGNOSIS — Z79.899: ICD-10-CM

## 2021-10-16 DIAGNOSIS — I25.2: ICD-10-CM

## 2021-10-16 PROCEDURE — U0002 COVID-19 LAB TEST NON-CDC: HCPCS

## 2021-10-16 NOTE — EDM.PDOC
ED HPI GENERAL MEDICAL PROBLEM





- General


Chief Complaint: Respiratory Problem


Stated Complaint: COV EXPO/FEVER


Time Seen by Provider: 10/16/21 11:29


Source of Information: Reports: Patient


History Limitations: Reports: No Limitations





- History of Present Illness


INITIAL COMMENTS - FREE TEXT/NARRATIVE: 


HISTORY AND PHYSICAL:





History of present illness:


Patient is a 46-year-old male who presents to the emergency room with complaints

of body aches, fever, SOB, and cough.  States his son recently was diagnosed 

with COVID-19 and they have been at home together for the past 1 week.  He is 

concerned he also has COVID-19.  He has been taking Tylenol and ibuprofen for 

symptomatic relief.  Not feeling any improvement. Patient denies any change in 

vision, syncope or near syncope. Denies any chest pain, back pain or hemoptysis.

Denies any GI or  symptoms. Patient has been eating and drinking 

appropriately. 





Review of systems: 


As per history of present illness and below otherwise all systems reviewed and 

negative.





Past medical history: 


As per history of present illness and as reviewed below otherwise 

noncontributory.





Surgical history: 


As per history of present illness and as reviewed below otherwise 

noncontributory.





Social history: 


See social history for further information





Family history: 


As per history of present illness and as reviewed below otherwise nonco

ntributory.





Physical exam:


General: Well developed and well nourished 46 year old male. Alert and 

orientated x 3. Nontoxic in appearance and in no acute distress. Vital signs are

stable and have been reviewed by me. Nursing notes were reviewed. 


HEENT: Atraumatic, normocephalic, pupils equal and reactive bilaterally, 

negative for conjunctival pallor or scleral icterus, mucous membranes moist, TMs

normal bilaterally, throat clear, neck supple, nontender, trachea midline. No 

drooling or trismus noted. No meningeal signs. No hot potato voice noted. 


Lungs: Clear to auscultation bilaterally. No wheezes, rales, or rhonchi. Chest 

nontender. Normal work of breathing, no accessory muscles used.


Heart: S1S2, regular rate and rhythm without overt murmur, gallops, or rubs. No 

JVD. No peripheral edema


Abdomen: Soft, nondistended, nontender. Normoactive bowel sounds. Negative for 

masses or costovertebral tenderness.


Skin: Intact, warm, dry. No lesions or rashes noted.


Hematologic: No petechiae or purpra. Mucosa appropriate color and normal nail 

bed color and refill.


Extremities: Atraumatic, moves all extremities per self without difficulty or 

deficits, negative for cords or calf pain. Neurovascular unremarkable.


Neuro: Awake, alert, oriented. Cranial nerves II through XII unremarkable. 

Cerebellum unremarkable. Motor and sensory unremarkable throughout. Exam 

nonfocal.


Psychiatric: Mood and affect are appropriate.  Normal thought process. Answering

questions appropriately.





Please note that the patient was seen and evaluated during the 2020 SARS-CoV-2 

novel coronavirus pandemic period.  Community viral transmission is ongoing at 

time of this encounter and the emergency department is operating under pandemic 

response procedures.





Medical Decision Making:


Patient is a 46-year-old male who presents to the emergency room after exposure 

to COVID-19 he is now symptomatic.  Physical exam is unremarkable.  Blood 

pressure is elevated, he states he hasn't taken his home medications. Will test 

for COVID/influenza and get chest x-ray.





+COVID 19. I have talked with the patient about today's findings, in addition to

providing specific details for plan of care.  Reassessment at the time of 

disposition demonstrates that the patient is in no acute distress. Due to 

patient health history and risk factors, I have ordered outpatient Monoclonal 

Antibioty Therapy. Order sent/faxed. The patient is stable for discharge, 

counseling was provided and we discussed in great detail signs and symptoms that

would prompt them to return to the Emergency Department. Medication, follow up 

and supportive care measures were reviewed and discussed. Voices understanding 

and is agreeable to plan of care. Denies any further questions or concerns at 

this time.





Diagnostics:


COVID-19/Influenza





Therapeutics:


None





Prescription:


Outpatient Regeneron





Impression: 


COVID-19





Plan:


1.  Your COVID-19 screening is positive.  That means you do have the coronavirus

and you are considered contagious.  Your vital signs and oxygen saturation are 

well enough that you were able to monitor your symptoms at home.  Continue to 

monitor for trouble breathing,  new confusion or inability to arouse, bluish 

lips or face or any of the other symptoms we discussed -if this occurs please 

return to the emergency room immediately.


2.  Please self quarantine until cleared by [facility] Department.  Inform any

persons that you have been in contact with since you started becoming 

symptomatic that you have tested positive; they should be made aware and take 

the appropriate steps as needed.


3. You can take NyQuil during the evening to help get a restful night sleep. May

alternate Tylenol and ibuprofen as needed for pain and fever management.


4. The [facility] department will be calling you and following up with you. 

The [place] COVID 19 Hotline phone number [phone], They are open Monday - 

Friday 7am - 7pm. Follow up with your primary care provider for re-evaluation as

directed.


Definitive disposition and diagnosis as appropriate pending reevaluation and 

review of above.





  ** body aches


Pain Score (Numeric/FACES): 5





- Related Data


                                    Allergies











Allergy/AdvReac Type Severity Reaction Status Date / Time


 


avocado AdvReac  Abdominal Verified 10/16/21 11:40





   Pain  


 


honey AdvReac  Abdominal Verified 10/16/21 11:40





   Pain  











Home Meds: 


                                    Home Meds





Aspirin 325 mg PO DAILY 05/01/20 [History]


Metoprolol Succinate 25 mg PO DAILY 05/01/20 [History]


atorvaSTATin [Lipitor] 80 mg PO DAILY 05/01/20 [History]


Omega-3 Acid Ethyl Esters [Lovaza] 1 gm PO BID 01/18/21 [History]


Omeprazole Magnesium [Prilosec] 20 mg PO DAILY #60 tab 01/19/21 [Rx]


Ondansetron [Zofran ODT] 4 mg PO Q6H PRN #12 tab.dis 07/18/21 [Rx]


Fenofibrate Nanocrystallized [Tricor] 1 dose PO DAILY 10/16/21 [History]











Past Medical History


HEENT History: Reports: None


Cardiovascular History: Reports: High Cholesterol, MI


Other Cardiovascular History: familial hypercholesterolemia


Respiratory History: Reports: None


Gastrointestinal History: Reports: Pancreatitis


Other Gastrointestinal History: pancreatitis x9; has a familial hyperlipidemia 

which he states it is linked to the pancreatitis


Genitourinary History: Reports: None


Musculoskeletal History: Reports: None


Neurological History: Reports: None


Psychiatric History: Reports: None


Endocrine/Metabolic History: Reports: None


Insulin Pump Model and : None


Hematologic History: Reports: None


Immunologic History: Reports: None


Oncologic (Cancer) History: Reports: None


Dermatologic History: Reports: None





- Infectious Disease History


Infectious Disease History: Reports: Chicken Pox





- Past Surgical History


Head Surgeries/Procedures: Reports: None


HEENT Surgical History: Reports: None


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


Other Cardiovascular Surgeries/Procedures: 4 vessels


Respiratory Surgical History: Reports: None


GI Surgical History: Reports: Cholecystectomy


Male  Surgical History: Reports: None


Endocrine Surgical History: Reports: None


Neurological Surgical History: Reports: None


Musculoskeletal Surgical History: Reports: None


Oncologic Surgical History: Reports: None


Dermatological Surgical History: Reports: None





Social & Family History





- Family History


Family Medical History: No Pertinent Family History





- Caffeine Use


Caffeine Use: Reports: Energy Drinks


Caffeine Use Comment: hx of energy drink use





ED ROS GENERAL





- Review of Systems


Review Of Systems: Comprehensive ROS is negative, except as noted in HPI.





ED EXAM, GENERAL





- Physical Exam


Exam: See Below (See dictation)





Course





- Vital Signs


Last Recorded V/S: 


                                Last Vital Signs











Temp  98 F   10/16/21 11:37


 


Pulse  90   10/16/21 11:37


 


Resp  16   10/16/21 11:37


 


BP  172/90 H  10/16/21 11:37


 


Pulse Ox  96   10/16/21 11:37














- Orders/Labs/Meds


Orders: 


                               Active Orders 24 hr











 Category Date Time Status


 


 Chest 1V Frontal [CR] Stat Exams  10/16/21 11:48 Taken











Labs: 


                                Laboratory Tests











  10/16/21 Range/Units





  11:38 


 


SARS-CoV-2 RNA (MAVIS)  POSITIVE H  (NEGATIVE)  














Departure





- Departure


Time of Disposition: 12:30


Disposition: Home, Self-Care 01


Clinical Impression: 


 COVID-19








- Discharge Information


Instructions:  10 Things You Can Do to Manage Your COVID-19 Symptoms at Home - 

[facility] (07/16/2021)


Referrals: 


PCP,None [Primary Care Provider] - 


Forms:  ED Department Discharge


Additional Instructions: 


The following information is given to patients seen in the emergency department 

who are being discharged to home. This information is to outline your options fo

r follow-up care. We provide all patients seen in our emergency department with 

a follow-up referral.





The need for follow-up, as well as the timing and circumstances, are variable 

depending upon the specifics of your emergency department visit.





If you don't have a primary care physician on staff, we will provide you with a 

referral. We always advise you to contact your personal physician following an 

emergency department visit to inform them of the circumstance of the visit and 

for follow-up with them and/or the need for any referrals to a consulting 

specialist.





The emergency department will also refer you to a specialist when appropriate. 

This referral assures that you have the opportunity for follow-up care with a 

specialist. All of these measure are taken in an effort to provide you with 

optimal care, which includes your follow-up.





Under all circumstances we always encourage you to contact your private 

physician who remains a resource for coordinating your care. When calling for 

follow-up care, please make the office aware that this follow-up is from your 

recent emergency room visit. If for any reason you are refused follow-up, please

contact the [facility] Emergency Department

at [phone] and asked to speak to the emergency department charge nurse.





[facility]


Primary Care


[address] [place] [id number]


Phone: [phone]


Fax: [phone]





[facility]


[address] [place] [address]


Phone: [phone]


Fax: [phone]





Thank you for choosing the [facility] emergency department in 

[place] for your medical needs today.  It was a pleasure caring for you. Today

you were seen in the emergency department for COVID-19.





1.  Your COVID-19 screening is positive.  That means you do have the coronavirus

and you are considered contagious.  Your vital signs and oxygen saturation are 

well enough that you were able to monitor your symptoms at home.  Continue to 

monitor for trouble breathing,  new confusion or inability to arouse, bluish 

lips or face or any of the other symptoms we discussed -if this occurs please 

return to the emergency room immediately.


2.  Please self quarantine until cleared by [facility] Department.  Inform any

persons that you have been in contact with since you started becoming sym

ptomatic that you have tested positive; they should be made aware and take the 

appropriate steps as needed.


3. You can take NyQuil during the evening to help get a restful night sleep. May

alternate Tylenol and ibuprofen as needed for pain and fever management.


4. The [facility] department will be calling you and following up with you. 

The [place] COVID 19 Hotline phone number [phone], They are open Monday - 

Friday 7am - 7pm. Follow up with your primary care provider for re-evaluation as

directed. 





Sepsis Event Note (ED)





- Focused Exam


Vital Signs: 


                                   Vital Signs











  Temp Pulse Resp BP Pulse Ox


 


 10/16/21 11:37  98 F  90  16  172/90 H  96














- My Orders


Last 24 Hours: 


My Active Orders





10/16/21 11:48


Chest 1V Frontal [CR] Stat 














- Assessment/Plan


Last 24 Hours: 


My Active Orders





10/16/21 11:48


Chest 1V Frontal [CR] Stat

## 2021-10-16 NOTE — CR
INDICATION:



Chest pain, shortness of breath.



TECHNIQUE:



Chest 1 view.



COMPARISON:



Chest radiograph 01/18/2021.



FINDINGS:



No focal consolidation, pleural effusion, pneumothorax. Slightly increased 

linear atelectasis in the left lung base. Normal heart size and pulmonary 

vascularity. Sternotomy. The bones are unremarkable.



IMPRESSION:



1. No acute cardiopulmonary findings.



2. Slightly increased linear atelectasis in the left lung base.



Dictated by Elda Novak MD @ 10/16/2021 12:46:56 PM



(Electronically Signed)

## 2021-11-23 ENCOUNTER — HOSPITAL ENCOUNTER (EMERGENCY)
Dept: HOSPITAL 56 - MW.ED | Age: 46
Discharge: HOME | End: 2021-11-23
Payer: COMMERCIAL

## 2021-11-23 DIAGNOSIS — E78.00: ICD-10-CM

## 2021-11-23 DIAGNOSIS — R10.9: Primary | ICD-10-CM

## 2021-11-23 DIAGNOSIS — I25.2: ICD-10-CM

## 2021-11-23 DIAGNOSIS — Z79.899: ICD-10-CM

## 2021-11-23 DIAGNOSIS — Z90.49: ICD-10-CM

## 2021-11-23 DIAGNOSIS — Z79.82: ICD-10-CM

## 2021-11-23 DIAGNOSIS — Z91.040: ICD-10-CM

## 2021-11-23 DIAGNOSIS — Z88.8: ICD-10-CM

## 2021-11-23 DIAGNOSIS — Z87.891: ICD-10-CM

## 2021-11-23 LAB
BUN SERPL-MCNC: 17 MG/DL (ref 7–18)
CHLORIDE SERPL-SCNC: 104 MMOL/L (ref 98–107)
CO2 SERPL-SCNC: 22.1 MMOL/L (ref 21–32)
GLUCOSE SERPL-MCNC: 117 MG/DL (ref 74–106)
LIPASE SERPL-CCNC: 112 U/L (ref 73–393)
POTASSIUM SERPL-SCNC: 4.3 MMOL/L (ref 3.5–5.1)
SODIUM SERPL-SCNC: 138 MMOL/L (ref 136–148)

## 2021-11-23 PROCEDURE — 93005 ELECTROCARDIOGRAM TRACING: CPT

## 2021-11-23 PROCEDURE — 96374 THER/PROPH/DIAG INJ IV PUSH: CPT

## 2021-11-23 PROCEDURE — 84484 ASSAY OF TROPONIN QUANT: CPT

## 2021-11-23 PROCEDURE — 96375 TX/PRO/DX INJ NEW DRUG ADDON: CPT

## 2021-11-23 PROCEDURE — 74177 CT ABD & PELVIS W/CONTRAST: CPT

## 2021-11-23 PROCEDURE — 81003 URINALYSIS AUTO W/O SCOPE: CPT

## 2021-11-23 PROCEDURE — 83690 ASSAY OF LIPASE: CPT

## 2021-11-23 PROCEDURE — 80053 COMPREHEN METABOLIC PANEL: CPT

## 2021-11-23 PROCEDURE — 85025 COMPLETE CBC W/AUTO DIFF WBC: CPT

## 2021-11-23 PROCEDURE — 71045 X-RAY EXAM CHEST 1 VIEW: CPT

## 2021-11-23 PROCEDURE — 36415 COLL VENOUS BLD VENIPUNCTURE: CPT

## 2021-11-23 PROCEDURE — 99284 EMERGENCY DEPT VISIT MOD MDM: CPT

## 2021-11-23 NOTE — EDM.PDOC
ED HPI GENERAL MEDICAL PROBLEM





- General


Chief Complaint: Abdominal Pain


Stated Complaint: STOMACH PAIN


Time Seen by Provider: 11/23/21 15:11


Source of Information: Reports: Patient


History Limitations: Reports: No Limitations





- History of Present Illness


INITIAL COMMENTS - FREE TEXT/NARRATIVE: 


HISTORY AND PHYSICAL:





History of present illness:


Patient is a 46-year-old male with a history of familial hyperlipidemia status 

post quadruple bypass and frequent pancreatitis, who presents emergency room 

today with concern of sudden onset periumbilical tenderness that he feels is his

frequent pancreatitis that started this afternoon.  Patient states that he does 

have pancreatitis frequently due to his high cholesterol levels and states that 

he does feel like he is having another flare of this.  Patient states he has 

required hospitalization for his pancreatitis before but states that has been 

quite some time since he has had to be hospitalized.  Patient states he has not 

taken anything today for his symptoms and denies any other symptoms or concerns.





Patient denies fever, chills, chest pain, shortness of breath, or cough. Denies 

headache, neck stiff ness, change in vision, syncope, or near syncope. Denies 

nausea, vomiting, diarrhea, constipation, or dysuria. Has not noted any blood in

urine or stool. Patient has been eating and drinking appropriately.





Review of systems: 


As per history of present illness and below otherwise all systems reviewed and 

negative.





Past medical history: 


As per history of present illness and as reviewed below otherwise noncontributo

ry.





Surgical history: 


As per history of present illness and as reviewed below otherwise 

noncontributory.





Social history: 


See social history for further information





Family history: 


As per history of present illness and as reviewed below otherwise 

noncontributory.





Physical exam:


General: Patient is alert, oriented, and in no acute distress. Patient sitting 

comfortably on exam table.  Vitals stable and reviewed by me.


HEENT: Atraumatic, normocephalic, pupils equal and reactive bilaterally, 

negative for conjunctival pallor or scleral icterus, mucous membranes moist, 

throat clear, neck supple, nontender, trachea midline. No drooling or trismus 

noted. No meningeal signs. No hot potato voice noted. 


Lungs: Clear to auscultation, breath sounds equal bilaterally, chest nontender.


Heart: S1S2, regular rate and rhythm without overt murmur


Abdomen: Soft, nondistended, moderate periumbilical tenderness without guarding,

negative rebound, negative Curry. Negative for masses or hepatosplenomegaly. 

Negative for costovertebral tenderness.


Pelvis: Stable nontender.


Genitourinary: Deferred.


Rectal: Deferred.


Skin: Intact, warm, dry. No lesions or rashes noted.


Extremities: Atraumatic, negative for cords or calf pain. Neurovascular 

unremarkable.


Neuro: Awake, alert, oriented. Cranial nerves II through XII unremarkable. 

Cerebellum unremarkable. Motor and sensory unremarkable throughout. Exam 

nonfocal.





Medical Decision Making:


Patient is a 46-year-old male, with a history of familial hyperlipidemia, prior 

quadruple bypass, and frequent pancreatitis, who presents emergency room today 

with concern of pancreatitis flare/upper abdominal pain starting this afternoon.

 Upon arrival to the ED, patient is vitally stable and well-appearing on exam.  

He does have significant periumbilical tenderness on exam.  Will obtain IV 

access, provide therapeutics while awaiting cardiac evaluation given his 

complicated medical history, and abdominal pelvic CT scan with contrast.





See Dr. Carmen's dictation for specific EKG interpretation.  Otherwise, normal 

sinus rhythm without STEMI.


Mild derangements of lab work today unremarkable.  Lipase within normal limits. 

Troponin negative.  Chest x-ray unremarkable.


Abdominal pelvic CT scan shows no acute findings.





Upon reevaluation of patient, he remains vitally stable and comfortable 

throughout stay in ED.  Signs and symptoms are prompt return to the ED 

thoroughly discussed with patient.  Discussed importance for follow-up with 

primary care provider.


Voices understanding and is agreeable to plan of care. Denies any further 

questions or concerns at this time.





Diagnostics:


EKG, CBC, CMP, lipase, troponin, urinalysis, chest x-ray 1 view, EKG, abdominal 

pelvic CT scan with contrast





Therapeutics:


NS, Dilaudid, Toradol





Prescription:


None





Impression: 


Abdominal pain unspecified





Plan:


1.  You can alternate ibuprofen and Tylenol as directed for pain and discomfort.


2.  Follow-up with a primary care provider as discussed.  Return to the ED as 

needed and as discussed.








Definitive disposition and diagnosis as appropriate pending reevaluation and 

review of above.


  ** Abdomen


Pain Score (Numeric/FACES): 10





- Related Data


                                    Allergies











Allergy/AdvReac Type Severity Reaction Status Date / Time


 


latex Allergy  Other Verified 11/23/21 14:35


 


avocado AdvReac  Abdominal Verified 11/23/21 14:35





   Pain  


 


honey AdvReac  Abdominal Verified 11/23/21 14:35





   Pain  











Home Meds: 


                                    Home Meds





Aspirin 325 mg PO DAILY 05/01/20 [History]


Metoprolol Succinate 25 mg PO DAILY 05/01/20 [History]


atorvaSTATin [Lipitor] 80 mg PO DAILY 05/01/20 [History]


Omega-3 Acid Ethyl Esters [Lovaza] 1 gm PO BID 01/18/21 [History]


Omeprazole Magnesium [Prilosec] 20 mg PO DAILY #60 tab 01/19/21 [Rx]


Fenofibrate Nanocrystallized [Tricor] 1 dose PO DAILY 10/16/21 [History]











Past Medical History


HEENT History: Reports: None


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


Other Cardiovascular History: familial hypercholesterolemia


Respiratory History: Reports: None


Gastrointestinal History: Reports: Pancreatitis


Other Gastrointestinal History: pancreatitis x9; has a familial hyperlipidemia 

which he states it is linked to the pancreatitis


Genitourinary History: Reports: None


Musculoskeletal History: Reports: None


Neurological History: Reports: None


Psychiatric History: Reports: None


Endocrine/Metabolic History: Reports: None


Insulin Pump Model and : None


Hematologic History: Reports: None


Immunologic History: Reports: None


Oncologic (Cancer) History: Reports: None


Dermatologic History: Reports: None





- Infectious Disease History


Infectious Disease History: Reports: Chicken Pox





- Past Surgical History


Head Surgeries/Procedures: Reports: None


HEENT Surgical History: Reports: None


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


Other Cardiovascular Surgeries/Procedures: 4 vessels


Respiratory Surgical History: Reports: None


GI Surgical History: Reports: Cholecystectomy


Male  Surgical History: Reports: None


Endocrine Surgical History: Reports: None


Neurological Surgical History: Reports: None


Musculoskeletal Surgical History: Reports: None


Oncologic Surgical History: Reports: None


Dermatological Surgical History: Reports: None





Social & Family History





- Family History


Family Medical History: No Pertinent Family History





- Tobacco Use


Tobacco Use Status *Q: Former Tobacco User


Used Tobacco, but Quit: Yes


Month/Year Tobacco Last Used: 2017





- Caffeine Use


Caffeine Use: Reports: Coffee, Energy Drinks, Soda


Caffeine Use Comment: hx of energy drink use





- Recreational Drug Use


Recreational Drug Use: No





ED ROS GENERAL





- Review of Systems


Review Of Systems: Comprehensive ROS is negative, except as noted in HPI.





ED EXAM, GENERAL





- Physical Exam


Exam: See Below (see dictation)





Course





- Vital Signs


Last Recorded V/S: 


                                Last Vital Signs











Temp  96.5 F L  11/23/21 14:36


 


Pulse  87   11/23/21 18:38


 


Resp  20   11/23/21 18:38


 


BP  134/97 H  11/23/21 18:38


 


Pulse Ox  98   11/23/21 18:38














- Orders/Labs/Meds


Orders: 


                               Active Orders 24 hr











 Category Date Time Status


 


 Saline Lock Insert [OM.PC] Stat Oth  11/23/21 15:12 Ordered











Labs: 


                                Laboratory Tests











  11/23/21 11/23/21 11/23/21 Range/Units





  15:40 15:40 15:40 


 


WBC   6.06   (4.0-11.0)  K/uL


 


RBC   4.60   (4.50-5.90)  M/uL


 


Hgb   14.7   (13.0-17.0)  g/dL


 


Hct   41.0   (38.0-50.0)  %


 


MCV   89.1   (80.0-98.0)  fL


 


MCH   32.0   (27.0-32.0)  pg


 


MCHC   35.9   (31.0-37.0)  g/dL


 


RDW Std Deviation   48.4   (28.0-62.0)  fl


 


RDW Coeff of Dajuan   15   (11.0-15.0)  %


 


Plt Count   181   (150-400)  K/uL


 


MPV   10.40   (7.40-12.00)  fL


 


Neut % (Auto)   67.0   (48.0-80.0)  %


 


Lymph % (Auto)   23.4   (16.0-40.0)  %


 


Mono % (Auto)   6.4   (0.0-15.0)  %


 


Eos % (Auto)   3.0   (0.0-7.0)  %


 


Baso % (Auto)   0.2   (0.0-1.5)  %


 


Neut # (Auto)   4.1   (1.4-5.7)  K/uL


 


Lymph # (Auto)   1.4   (0.6-2.4)  K/uL


 


Mono # (Auto)   0.4   (0.0-0.8)  K/uL


 


Eos # (Auto)   0.2   (0.0-0.7)  K/uL


 


Baso # (Auto)   0.0   (0.0-0.1)  K/uL


 


Nucleated RBC %   0.0   /100WBC


 


Nucleated RBCs #   0   K/uL


 


Sodium    138  (136-148)  mmol/L


 


Potassium    4.3  (3.5-5.1)  mmol/L


 


Chloride    104  ()  mmol/L


 


Carbon Dioxide    22.1  (21.0-32.0)  mmol/L


 


BUN    17  (7.0-18.0)  mg/dL


 


Creatinine    1.0  (0.8-1.3)  mg/dL


 


Est Cr Clr Drug Dosing    81.52  mL/min


 


Estimated GFR (MDRD)    > 60.0  ml/min


 


Glucose    117 H  ()  mg/dL


 


Calcium    8.2 L  (8.5-10.1)  mg/dL


 


Total Bilirubin    0.6  (0.2-1.0)  mg/dL


 


AST    40 H  (15-37)  IU/L


 


ALT    42  (14-63)  IU/L


 


Alkaline Phosphatase    70  ()  U/L


 


Troponin I    < 0.050  (0.000-0.056)  ng/mL


 


Total Protein    7.6  (6.4-8.2)  g/dL


 


Albumin    3.5  (3.4-5.0)  g/dL


 


Globulin    4.1 H  (2.6-4.0)  g/dL


 


Albumin/Globulin Ratio    0.9  (0.9-1.6)  


 


Lipase    112  ()  U/L


 


Urine Color  YELLOW    


 


Urine Appearance  CLEAR    


 


Urine pH  6.0    (5.0-8.0)  


 


Ur Specific Gravity  >= 1.030    (1.001-1.035)  


 


Urine Protein  NEGATIVE    (NEGATIVE)  mg/dL


 


Urine Glucose (UA)  NEGATIVE    (NEGATIVE)  mg/dL


 


Urine Ketones  NEGATIVE    (NEGATIVE)  mg/dL


 


Urine Occult Blood  NEGATIVE    (NEGATIVE)  


 


Urine Nitrite  NEGATIVE    (NEGATIVE)  


 


Urine Bilirubin  NEGATIVE    (NEGATIVE)  


 


Urine Urobilinogen  0.2    (<2.0)  EU/dL


 


Ur Leukocyte Esterase  NEGATIVE    (NEGATIVE)  











Meds: 


Medications














Discontinued Medications














Generic Name Dose Route Start Last Admin





  Trade Name Freq  PRN Reason Stop Dose Admin


 


Hydromorphone HCl  0.5 mg  11/23/21 15:54  11/23/21 16:11





  Hydromorphone 2 Mg/Ml Syringe  IVPUSH  11/23/21 15:55  0.5 mg





  ONETIME ONE   Administration


 


Iopamidol  100 ml  11/23/21 17:29  11/23/21 17:29





  Iopamidol 755 Mg/Ml 500 Ml Multipack Bottle  IVPUSH  11/23/21 17:30  100 ml





  ONETIME STA   Administration


 


Ketorolac Tromethamine  30 mg  11/23/21 15:54  11/23/21 16:10





  Ketorolac 30 Mg/Ml Sdv  IVPUSH  11/23/21 15:55  30 mg





  ONETIME ONE   Administration


 


Ondansetron HCl  4 mg  11/23/21 15:54  11/23/21 16:11





  Ondansetron 4 Mg/2 Ml Sdv  IVPUSH  11/23/21 15:55  4 mg





  ONETIME ONE   Administration


 


Sodium Chloride  10 ml  11/23/21 15:12  11/23/21 15:28





  Sodium Chloride 0.9% 10 Ml Syringe  FLUSH   10 ml





  ASDIRECTED PRN   Administration





  Keep Vein Open  


 


Sodium Chloride  2.5 ml  11/23/21 15:12  11/23/21 15:28





  Sodium Chloride 0.9% 2.5 Ml Syringe  FLUSH   2.5 ml





  ASDIRECTED PRN   Administration





  Keep Vein Open  














Departure





- Departure


Time of Disposition: 18:21


Disposition: Home, Self-Care 01


Clinical Impression: 


 Abdominal pain








- Discharge Information


Instructions:  Abdominal Pain, Adult, Easy-to-Read


Referrals: 


Charly Ordaz MD [Primary Care Provider] - 


Forms:  ED Department Discharge


Additional Instructions: 


The following information is given to patients seen in the emergency department 

who are being discharged to home. This information is to outline your options 

for follow-up care. We provide all patients seen in our emergency department 

with a follow-up referral.





The need for follow-up, as well as the timing and circumstances, are variable 

depending upon the specifics of your emergency department visit.





If you don't have a primary care physician on staff, we will provide you with a 

referral. We always advise you to contact your personal physician following an 

emergency department visit to inform them of the circumstance of the visit and 

for follow-up with them and/or the need for any referrals to a consulting 

specialist.





The emergency department will also refer you to a specialist when appropriate. 

This referral assures that you have the opportunity for follow-up care with a 

specialist. All of these measure are taken in an effort to provide you with 

optimal care, which includes your follow-up.





Under all circumstances we always encourage you to contact your private 

physician who remains a resource for coordinating your care. When calling for 

follow-up care, please make the office aware that this follow-up is from your 

recent emergency room visit. If for any reason you are refused follow-up, please

contact the [facility] Emergency Department

at [phone] and asked to speak to the emergency department charge nurse.





[facility]


Primary Care


[address] [place] [address]


Phone: [phone]


Fax: [phone]





[facility]


[address] [place] [address]


Phone: [phone]


Fax: [phone]





One.  You can alternate ibuprofen and Tylenol as directed for pain and 

discomfort.


2.  Follow-up with a primary care provider as discussed.  Return to the ED as 

needed and as discussed.





 











Sepsis Event Note (ED)





- Focused Exam


Vital Signs: 


                                   Vital Signs











  Temp Pulse Resp BP Pulse Ox


 


 11/23/21 18:38   87  20  134/97 H  98


 


 11/23/21 14:36  96.5 F L  86  16  133/79  95














- My Orders


Last 24 Hours: 


My Active Orders





11/23/21 15:12


Saline Lock Insert [OM.PC] Stat 














- Assessment/Plan


Last 24 Hours: 


My Active Orders





11/23/21 15:12


Saline Lock Insert [OM.PC] Stat

## 2021-11-23 NOTE — CT
INDICATION:



Indication:



INDICATION:



Periumbilical pain, history of hyperlipidemia, history of pancreatitis.



TECHNIQUE:



IV contrast-enhanced CT abdomen and pelvis. 100 mL [id number] injected.



COMPARISON:



07/15/2019 abdomen pelvis CT.



FINDINGS:



Liver, pancreas, spleen, adrenal glands, and kidneys are normal. 

Cholecystectomy. No biliary dilation. Bowel is unremarkable. Appendix is 

normal. There is calcification of the vas deferens bilaterally, a 

nonspecific finding. No adenopathy, free air, or free fluid.



IMPRESSION:



No acute findings.



Please note that all CT scans at this facility use dose modulation, 

iterative reconstruction, and/or weight-based dosing when appropriate to 

reduce radiation dose to as low as reasonably achievable.



Dictated by Juan Kaur MD @ 11/23/2021 6:07:19 PM



(Electronically Signed)

## 2021-11-23 NOTE — PCM.EKG
** #1 Interpretation


EKG Interpretation Comments: 





KG sinus rhythm heart rate 87  QT duration 435 axis 33 QRS late transition

R wave P wave consistent with left atrial enlargement.  There is ST elevation 

consistent with early repole compared to 7/18/2021 no acute change impression no

injury

## 2021-11-23 NOTE — CR
INDICATION:



Pain 



TECHNIQUE:



Chest 1 view. 



COMPARISON:



And/16/21 



FINDINGS:



Cardiovascular and mediastinum: Heart size and vasculature are normal in 

caliber and appearance.  Mediastinum is within normal limits. Sternotomy 

wires noted. 



Lungs and pleural space: Lungs are clear.  No sign of infiltrate or mass.  

No sign of pleural effusion.  No pneumothorax.  



Bones and soft tissues: No significant findings.    



IMPRESSION:



Unremarkable chest.



Dictated by Robert Dasilva MD @ 11/23/2021 3:56:21 PM



(Electronically Signed)

## 2021-12-31 ENCOUNTER — HOSPITAL ENCOUNTER (EMERGENCY)
Dept: HOSPITAL 56 - MW.ED | Age: 46
Discharge: HOME | End: 2021-12-31
Payer: MEDICAID

## 2021-12-31 DIAGNOSIS — Z79.899: ICD-10-CM

## 2021-12-31 DIAGNOSIS — Z91.040: ICD-10-CM

## 2021-12-31 DIAGNOSIS — Z20.822: ICD-10-CM

## 2021-12-31 DIAGNOSIS — Z79.82: ICD-10-CM

## 2021-12-31 DIAGNOSIS — Z91.030: ICD-10-CM

## 2021-12-31 DIAGNOSIS — E78.00: ICD-10-CM

## 2021-12-31 DIAGNOSIS — Z86.16: ICD-10-CM

## 2021-12-31 DIAGNOSIS — R10.10: Primary | ICD-10-CM

## 2021-12-31 DIAGNOSIS — I25.2: ICD-10-CM

## 2021-12-31 LAB
BUN SERPL-MCNC: 13 MG/DL (ref 7–18)
CHLORIDE SERPL-SCNC: 104 MMOL/L (ref 98–107)
CO2 SERPL-SCNC: 22.6 MMOL/L (ref 21–32)
FLUAV RNA UPPER RESP QL NAA+PROBE: NEGATIVE
FLUBV RNA UPPER RESP QL NAA+PROBE: NEGATIVE
GLUCOSE SERPL-MCNC: 105 MG/DL (ref 74–106)
LIPASE SERPL-CCNC: 84 U/L (ref 73–393)
POTASSIUM SERPL-SCNC: 4 MMOL/L (ref 3.5–5.1)
RSV RNA UPPER RESP QL NAA+PROBE: NEGATIVE
SARS-COV-2 RNA RESP QL NAA+PROBE: NEGATIVE
SODIUM SERPL-SCNC: 139 MMOL/L (ref 136–148)

## 2021-12-31 PROCEDURE — 80053 COMPREHEN METABOLIC PANEL: CPT

## 2021-12-31 PROCEDURE — 99284 EMERGENCY DEPT VISIT MOD MDM: CPT

## 2021-12-31 PROCEDURE — 85025 COMPLETE CBC W/AUTO DIFF WBC: CPT

## 2021-12-31 PROCEDURE — 36415 COLL VENOUS BLD VENIPUNCTURE: CPT

## 2021-12-31 PROCEDURE — 83690 ASSAY OF LIPASE: CPT

## 2021-12-31 PROCEDURE — 74177 CT ABD & PELVIS W/CONTRAST: CPT

## 2021-12-31 PROCEDURE — 0241U: CPT

## 2021-12-31 PROCEDURE — 96374 THER/PROPH/DIAG INJ IV PUSH: CPT

## 2021-12-31 PROCEDURE — 96375 TX/PRO/DX INJ NEW DRUG ADDON: CPT

## 2021-12-31 NOTE — EDM.PDOC
ED HPI GENERAL MEDICAL PROBLEM





- General


Chief Complaint: Abdominal Pain


Stated Complaint: STOMACH PAIN


Time Seen by Provider: 12/31/21 17:39


Source of Information: Reports: Patient


History Limitations: Reports: No Limitations





- History of Present Illness


INITIAL COMMENTS - FREE TEXT/NARRATIVE: 


HISTORY AND PHYSICAL:





History of present illness:


Patient is a 46-year-old male history of recurrent pancreatitis presents to the 

emergency department with complaints of upper abdominal pain that extends across

the upper abdomen that started around 10:30 AM this morning.  The patient states

that he does have some nausea vomiting and diarrhea.  The patient states that 

while diarrhea is not normal when he is having a flare of pancreatitis he has 

had diarrhea in the past.  The patient is not a drinker and states this is 

genetic.  He states that he has a long history of hyperlipidemia.





Patient denies any fever, chills, headache, change in vision, syncope or near 

syncope. Denies any chest pain, back pain, shortness of breath or cough. Denies 

any constipation or dysuria. Has not noted any blood in urine or stool. 





Review of systems: 


As per history of present illness and below otherwise all systems reviewed and 

negative.





Past medical history: 


As per history of present illness and as reviewed below otherwise 

noncontributory.





Surgical history: 


As per history of present illness and as reviewed below otherwise 

noncontributory.





Social history: 


See social history for further information





Family history: 


As per history of present illness and as reviewed below otherwise 

noncontributory.





Physical exam:


General: Well developed and well nourished. Alert and orientated x 3. Nontoxic 

in appearance and in no acute distress. Vital signs are stable and have been 

reviewed by me. Nursing notes were reviewed. 


HEENT: Atraumatic, normocephalic, pupils equal and reactive bilaterally, 

negative for conjunctival pallor or scleral icterus, mucous membranes moist, TMs

normal bilaterally, throat clear, neck supple, nontender, trachea midline. No 

drooling or trismus noted. No meningeal signs. No hot potato voice noted. 


Lungs: Clear to auscultation bilaterally. No wheezes, rales, or rhonchi.   Chest

nontender. Normal work of breathing, no accessory muscles used.


Heart: S1S2, regular rate and rhythm without overt murmur, gallops, or rubs. No 

JVD. No peripheral edema


Abdomen: Soft, nondistended, generalized tenderness. Normoactive bowel sounds. 

Negative for masses or costovertebral tenderness.


Skin: Intact, warm, dry. No lesions or rashes noted.


Hematologic: No petechiae or purpra. Mucosa appropriate color and normal nail 

bed color and refill.


Extremities: Atraumatic, moves all extremities per self without difficulty or 

deficits, negative for cords or calf pain. Neurovascular unremarkable.


Neuro: Awake, alert, oriented. Cranial nerves II through XII unremarkable. 

Cerebellum unremarkable. Motor and sensory unremarkable throughout. Exam 

nonfocal.


Psychiatric: Mood and affect are appropriate.  Normal thought process. Answering

questions appropriately.





Notes:


*This patient was seen and evaluated during the 2020 SARS-CoV-2 novel 

coronavirus pandemic period.  Community viral transmission is ongoing at time of

this encounter and the emergency department is operating under pandemic response

procedures.





As stated above the patient is a 46-year-old male who presents to the emergency 

department with complaints of upper abdominal pain extending across his upper 

abdomen that started at 1030 this morning.  The patient states that he has a 

history of pancreatitis and has been hospitalized 12 times.  I will do a general

GI work-up to include lab work and a CT.  I will treat the patient's pain with 

IV fluids, Zofran, and Dilaudid for his pain.  The patient is agreeable with 

this plan.





The patient CBC is unremarkable as is the chemistry panel.  The patient's lipase

is 84.  The patient's influenza and COVID-19 swab is negative.  Abdomen/pelvis 

CT IMPRESSION: 1. Status post cholecystectomy. 2. Fatty infiltration of the 

liver. 3. Negative CT abdomen and pelvis with intravenous contrast.  I informed 

the patient of the results in the pain is most likely his chronic abdominal pain

that he needs to work with his primary care on.  The patient verbalized 

understanding.





I have talked with the patient about today's findings, in addition to providing 

specific details for plan of care.  Reassessment at the time of disposition 

demonstrates that the patient is in no acute distress.  The patient is stable 

for discharge, counseling was provided and we discussed in great detail signs 

and symptoms that would prompt them to return to the Emergency Department. 

Medication, follow up and supportive care measures were reviewed and discussed. 

Voices understanding and is agreeable to plan of care. Denies any further 

questions or concerns at this time.





Diagnostics: CBC, CMP, lipase, abdomen/pelvis CT





Therapeutics: IV fluids, Zofran, Dilaudid





Impression: Abdominal pain





Plan:


1.  You were evaluated today on an emergent basis. Your complaints of abdominal 

pain was evaluated with blood work which were entirely normal. Your lipase was 

not elevated at all. Your CT was also normal. You were treated in the emergency 

department with IV fluids, Dilaudid a pain medication, and Zofran for nausea. 

Please follow-up with your primary care provider for this chronic problem. If 

you were to have unmanageable pain again please return to the emergency 

department.


2.  You can alternate Tylenol and ibuprofen as needed for pain and fever 

management.


3. We encourage you to follow up with your primary care provider and/or 

recommended specialist in the next few days for re-evaluation and further 

care/management. 


4. If your symptoms should worsen, new symptoms develop or any of the signs and 

symptoms we discussed should arise please return to the emergency room or call 

911 (if needed).





Definitive disposition and diagnosis as appropriate pending reevaluation and re

view of above.





  ** Abdomen


Pain Score (Numeric/FACES): 10





- Related Data


                                    Allergies











Allergy/AdvReac Type Severity Reaction Status Date / Time


 


latex Allergy  Other Verified 11/23/21 14:35


 


avocado AdvReac  Abdominal Verified 11/23/21 14:35





   Pain  


 


honey AdvReac  Abdominal Verified 11/23/21 14:35





   Pain  











Home Meds: 


                                    Home Meds





Aspirin 325 mg PO DAILY PRN 05/01/20 [History]


atorvaSTATin [Lipitor] 80 mg PO DAILY 05/01/20 [History]


Omega-3 Acid Ethyl Esters [Lovaza] 1 gm PO BID 01/18/21 [History]


Omeprazole Magnesium [Prilosec] 20 mg PO DAILY #60 tab 01/19/21 [Rx]


Fenofibrate Nanocrystallized [Tricor] 1 dose PO DAILY 10/16/21 [History]


ClonazePAM [KlonoPIN] 1 tab PO PRN 12/31/21 [History]


Testosterone Enanthate [Xyosted]  12/31/21 [History]











Past Medical History


HEENT History: Reports: None


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


Other Cardiovascular History: familial hypercholesterolemia


Respiratory History: Reports: None


Gastrointestinal History: Reports: Pancreatitis


Other Gastrointestinal History: pancreatitis x9; has a familial hyperlipidemia 

which he states it is linked to the pancreatitis


Genitourinary History: Reports: None


Musculoskeletal History: Reports: None


Neurological History: Reports: None


Psychiatric History: Reports: None


Endocrine/Metabolic History: Reports: None


Insulin Pump Model and : None


Hematologic History: Reports: None


Immunologic History: Reports: None


Oncologic (Cancer) History: Reports: None


Dermatologic History: Reports: None





- Infectious Disease History


Infectious Disease History: Reports: Chicken Pox, Novel Coronavirus





- Past Surgical History


Head Surgeries/Procedures: Reports: None


HEENT Surgical History: Reports: None


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


Other Cardiovascular Surgeries/Procedures: 4 vessels


Respiratory Surgical History: Reports: None


GI Surgical History: Reports: Cholecystectomy


Male  Surgical History: Reports: None


Endocrine Surgical History: Reports: None


Neurological Surgical History: Reports: None


Musculoskeletal Surgical History: Reports: None


Oncologic Surgical History: Reports: None


Dermatological Surgical History: Reports: None





Social & Family History





- Family History


Family Medical History: No Pertinent Family History





- Caffeine Use


Caffeine Use: Reports: Coffee


Caffeine Use Comment: hx of energy drink use





- Recreational Drug Use


Recreational Drug Use: No





ED ROS GENERAL





- Review of Systems


Review Of Systems: Comprehensive ROS is negative, except as noted in HPI.





ED EXAM, GI/ABD





- Physical Exam


Exam: See Below (See dictation)





Course





- Vital Signs


Last Recorded V/S: 


                                Last Vital Signs











Temp  98.8 F   12/31/21 15:47


 


Pulse  91   12/31/21 20:15


 


Resp  16   12/31/21 20:15


 


BP  125/86   12/31/21 20:15


 


Pulse Ox  97   12/31/21 20:15














- Orders/Labs/Meds


Orders: 


                               Active Orders 24 hr











 Category Date Time Status


 


 Saline Lock Insert [OM.PC] Stat Oth  12/31/21 17:43 Ordered











Labs: 


                                Laboratory Tests











  12/31/21 12/31/21 12/31/21 Range/Units





  17:53 18:24 18:24 


 


WBC   4.31   (4.0-11.0)  K/uL


 


RBC   4.23 L   (4.50-5.90)  M/uL


 


Hgb   13.2   (13.0-17.0)  g/dL


 


Hct   38.2   (38.0-50.0)  %


 


MCV   90.3   (80.0-98.0)  fL


 


MCH   31.2   (27.0-32.0)  pg


 


MCHC   34.6   (31.0-37.0)  g/dL


 


RDW Std Deviation   48.1   (28.0-62.0)  fl


 


RDW Coeff of Dajuan   15   (11.0-15.0)  %


 


Plt Count   165   (150-400)  K/uL


 


MPV   10.00   (7.40-12.00)  fL


 


Neut % (Auto)   57.4   (48.0-80.0)  %


 


Lymph % (Auto)   33.6   (16.0-40.0)  %


 


Mono % (Auto)   5.3   (0.0-15.0)  %


 


Eos % (Auto)   3.5   (0.0-7.0)  %


 


Baso % (Auto)   0.2   (0.0-1.5)  %


 


Neut # (Auto)   2.5   (1.4-5.7)  K/uL


 


Lymph # (Auto)   1.5   (0.6-2.4)  K/uL


 


Mono # (Auto)   0.2   (0.0-0.8)  K/uL


 


Eos # (Auto)   0.2   (0.0-0.7)  K/uL


 


Baso # (Auto)   0.0   (0.0-0.1)  K/uL


 


Nucleated RBC %   0.0   /100WBC


 


Nucleated RBCs #   0   K/uL


 


Sodium    139  (136-148)  mmol/L


 


Potassium    4.0  (3.5-5.1)  mmol/L


 


Chloride    104  ()  mmol/L


 


Carbon Dioxide    22.6  (21.0-32.0)  mmol/L


 


BUN    13  (7.0-18.0)  mg/dL


 


Creatinine    1.0  (0.8-1.3)  mg/dL


 


Est Cr Clr Drug Dosing    81.52  mL/min


 


Estimated GFR (MDRD)    > 60.0  ml/min


 


Glucose    105  ()  mg/dL


 


Calcium    8.0 L  (8.5-10.1)  mg/dL


 


Total Bilirubin    0.6  (0.2-1.0)  mg/dL


 


AST    26  (15-37)  IU/L


 


ALT    53  (14-63)  IU/L


 


Alkaline Phosphatase    59  ()  U/L


 


Total Protein    6.5  (6.4-8.2)  g/dL


 


Albumin    3.4  (3.4-5.0)  g/dL


 


Globulin    3.1  (2.6-4.0)  g/dL


 


Albumin/Globulin Ratio    1.1  (0.9-1.6)  


 


Lipase    84  ()  U/L


 


Influenza Type A RNA  NEGATIVE    (NEGATIVE)  


 


RSV RNA (INAAT)  NEGATIVE    (NEGATIVE)  


 


Influenza Type B RNA  NEGATIVE    (NEGATIVE)  


 


SARS-CoV-2 RNA (MAVIS)  NEGATIVE    (NEGATIVE)  











Meds: 


Medications














Discontinued Medications














Generic Name Dose Route Start Last Admin





  Trade Name Freq  PRN Reason Stop Dose Admin


 


Hydromorphone HCl  1 mg  12/31/21 17:44  12/31/21 18:06





  Hydromorphone 1 Mg/Ml Syringe  IVPUSH  12/31/21 17:45  1 mg





  ONETIME ONE   Administration


 


Sodium Chloride  1,000 mls @ 999 mls/hr  12/31/21 17:43  12/31/21 18:06





  Normal Saline  IV  12/31/21 18:43  999 mls/hr





  BOLUS ONE   Administration


 


Iopamidol  100 ml  12/31/21 18:44  12/31/21 18:45





  Iopamidol 755 Mg/Ml 500 Ml Multipack Bottle  IVPUSH  12/31/21 18:45  100 ml





  ONETIME ONE   Administration


 


Ondansetron HCl  4 mg  12/31/21 17:43  12/31/21 18:06





  Ondansetron 4 Mg/2 Ml Sdv  IVPUSH  12/31/21 17:44  4 mg





  ONETIME ONE   Administration


 


Sodium Chloride  2.5 ml  12/31/21 17:43  12/31/21 18:07





  Sodium Chloride 0.9% 2.5 Ml Syringe  FLUSH   2.5 ml





  ASDIRECTED PRN   Administration





  Keep Vein Open  


 


Sodium Chloride  10 ml  12/31/21 17:43  12/31/21 18:06





  Sodium Chloride 0.9% 10 Ml Syringe  FLUSH   10 ml





  ASDIRECTED PRN   Administration





  Keep Vein Open  














Departure





- Departure


Time of Disposition: 19:27


Disposition: Home, Self-Care 01


Condition: Good


Clinical Impression: 


 Abdominal pain in male








- Discharge Information


*PRESCRIPTION DRUG MONITORING PROGRAM REVIEWED*: No


*COPY OF PRESCRIPTION DRUG MONITORING REPORT IN PATIENT HUMBERTO: No


Instructions:  Abdominal Pain, Adult, Easy-to-Read


Referrals: 


Fernando Coronado MD [Primary Care Provider] - 


Forms:  ED Department Discharge


Additional Instructions: 


The following information is given to patients seen in the emergency department 

who are being discharged to home. This information is to outline your options 

for follow-up care. We provide all patients seen in our emergency department 

with a follow-up referral.





The need for follow-up, as well as the timing and circumstances, are variable 

depending upon the specifics of your emergency department visit.





If you don't have a primary care physician on staff, we will provide you with a 

referral. We always advise you to contact your personal physician following an 

emergency department visit to inform them of the circumstance of the visit and 

for follow-up with them and/or the need for any referrals to a consulting 

specialist.





The emergency department will also refer you to a specialist when appropriate. 

This referral assures that you have the opportunity for follow-up care with a 

specialist. All of these measure are taken in an effort to provide you with 

optimal care, which includes your follow-up.





Under all circumstances we always encourage you to contact your private 

physician who remains a resource for coordinating your care. When calling for 

follow-up care, please make the office aware that this follow-up is from your 

recent emergency room visit. If for any reason you are refused follow-up, please

contact the [facility] Emergency Department

at [phone] and asked to speak to the emergency department charge nurse.





[facility]


[address] [place] [address]


Phone: [phone]


Fax: [phone]





[place] [address]


Phone: [phone]


Fax: [phone]





Plan:


1.  You were evaluated today on an emergent basis. Your complaints of abdominal 

pain was evaluated with blood work which were entirely normal. Your lipase was 

not elevated at all. Your CT was also normal. You were treated in the emergency 

department with IV fluids, Dilaudid a pain medication, and Zofran for nausea. 

Please follow-up with your primary care provider for this chronic problem. If 

you were to have unmanageable pain again please return to the emergency 

department.


2.  You can alternate Tylenol and ibuprofen as needed for pain and fever 

management.


3. We encourage you to follow up with your primary care provider and/or 

recommended specialist in the next few days for re-evaluation and further 

care/management. 


4. If your symptoms should worsen, new symptoms develop or any of the signs and 

symptoms we discussed should arise please return to the emergency room or call 

911 (if needed).








Sepsis Event Note (ED)





- Evaluation


Sepsis Screening Result: No Definite Risk





- My Orders


Last 24 Hours: 


My Active Orders





12/31/21 17:43


Saline Lock Insert [OM.PC] Stat 














- Assessment/Plan


Last 24 Hours: 


My Active Orders





12/31/21 17:43


Saline Lock Insert [OM.PC] Stat

## 2021-12-31 NOTE — CT
INDICATION:



Generalized abdominal pain.



COMPARISON:



CT abdomen and pelvis July 15, 2019 and 11/23/2021.



TECHNIQUE:



CT abdomen and pelvis with intravenous contrast; coronal and sagittal 

reformats.



FINDINGS:



No abnormal intra pulmonary nodular densities through the lung bases. No 

evidence of pleural effusion. Normal size cardiac silhouette without any 

pericardial effusion. Mild diffuse fatty infiltration of the liver. No 

focal hepatic or splenic pathology. No pancreatic pathology. Status post 

cholecystectomy. No adrenal pathology. No kidney stones or obstructive 

uropathy. No retroperitoneal lymphadenopathy. Normal appendix. CT study of 

the pelvis is unremarkable.



IMPRESSION:



1. Status post cholecystectomy.



2. Fatty infiltration of the liver.



3. Negative CT abdomen and pelvis with intravenous contrast.



Please note that all CT scans at this facility use dose modulation, 

iterative reconstruction, and/or weight-based dosing when appropriate to 

reduce radiation dose to as low as reasonably achievable.



Dictated by Nii Callahan MD @ 12/31/2021 6:55:19 PM



(Electronically Signed)

## 2022-04-22 ENCOUNTER — HOSPITAL ENCOUNTER (EMERGENCY)
Dept: HOSPITAL 56 - MW.ED | Age: 47
Discharge: HOME | End: 2022-04-22
Payer: COMMERCIAL

## 2022-04-22 DIAGNOSIS — E78.00: ICD-10-CM

## 2022-04-22 DIAGNOSIS — Z88.8: ICD-10-CM

## 2022-04-22 DIAGNOSIS — Z86.16: ICD-10-CM

## 2022-04-22 DIAGNOSIS — Z79.899: ICD-10-CM

## 2022-04-22 DIAGNOSIS — K29.70: Primary | ICD-10-CM

## 2022-04-22 DIAGNOSIS — I25.2: ICD-10-CM

## 2022-04-22 LAB
BUN SERPL-MCNC: 14 MG/DL (ref 7–18)
CHLORIDE SERPL-SCNC: 101 MMOL/L (ref 98–107)
CO2 SERPL-SCNC: 23.3 MMOL/L (ref 21–32)
GLUCOSE SERPL-MCNC: 113 MG/DL (ref 74–106)
LIPASE SERPL-CCNC: 128 U/L (ref 73–393)
POTASSIUM SERPL-SCNC: 3.6 MMOL/L (ref 3.5–5.1)
SODIUM SERPL-SCNC: 136 MMOL/L (ref 136–148)

## 2022-04-22 PROCEDURE — 99284 EMERGENCY DEPT VISIT MOD MDM: CPT

## 2022-04-22 PROCEDURE — 36415 COLL VENOUS BLD VENIPUNCTURE: CPT

## 2022-04-22 PROCEDURE — 80053 COMPREHEN METABOLIC PANEL: CPT

## 2022-04-22 PROCEDURE — 83690 ASSAY OF LIPASE: CPT

## 2022-04-22 PROCEDURE — C9113 INJ PANTOPRAZOLE SODIUM, VIA: HCPCS

## 2022-04-22 PROCEDURE — 96375 TX/PRO/DX INJ NEW DRUG ADDON: CPT

## 2022-04-22 PROCEDURE — 85025 COMPLETE CBC W/AUTO DIFF WBC: CPT

## 2022-04-22 PROCEDURE — 96374 THER/PROPH/DIAG INJ IV PUSH: CPT

## 2022-05-12 ENCOUNTER — HOSPITAL ENCOUNTER (EMERGENCY)
Dept: HOSPITAL 56 - MW.ED | Age: 47
Discharge: HOME | End: 2022-05-12
Payer: COMMERCIAL

## 2022-05-12 DIAGNOSIS — Z91.030: ICD-10-CM

## 2022-05-12 DIAGNOSIS — E78.00: ICD-10-CM

## 2022-05-12 DIAGNOSIS — I25.2: ICD-10-CM

## 2022-05-12 DIAGNOSIS — Z91.018: ICD-10-CM

## 2022-05-12 DIAGNOSIS — Z91.040: ICD-10-CM

## 2022-05-12 DIAGNOSIS — Z79.82: ICD-10-CM

## 2022-05-12 DIAGNOSIS — Z86.16: ICD-10-CM

## 2022-05-12 DIAGNOSIS — Z79.899: ICD-10-CM

## 2022-05-12 DIAGNOSIS — R10.12: ICD-10-CM

## 2022-05-12 DIAGNOSIS — Z90.49: ICD-10-CM

## 2022-05-12 DIAGNOSIS — R10.11: Primary | ICD-10-CM

## 2022-05-12 LAB
BUN SERPL-MCNC: 13 MG/DL (ref 7–18)
CHLORIDE SERPL-SCNC: 102 MMOL/L (ref 98–107)
CO2 SERPL-SCNC: 27.3 MMOL/L (ref 21–32)
GLUCOSE SERPL-MCNC: 123 MG/DL (ref 74–106)
LIPASE SERPL-CCNC: 133 U/L (ref 73–393)
POTASSIUM SERPL-SCNC: 3.9 MMOL/L (ref 3.5–5.1)
SODIUM SERPL-SCNC: 139 MMOL/L (ref 136–148)

## 2022-05-12 PROCEDURE — 96361 HYDRATE IV INFUSION ADD-ON: CPT

## 2022-05-12 PROCEDURE — 36415 COLL VENOUS BLD VENIPUNCTURE: CPT

## 2022-05-12 PROCEDURE — 80053 COMPREHEN METABOLIC PANEL: CPT

## 2022-05-12 PROCEDURE — 83690 ASSAY OF LIPASE: CPT

## 2022-05-12 PROCEDURE — 96374 THER/PROPH/DIAG INJ IV PUSH: CPT

## 2022-05-12 PROCEDURE — 85025 COMPLETE CBC W/AUTO DIFF WBC: CPT

## 2022-05-12 PROCEDURE — 99284 EMERGENCY DEPT VISIT MOD MDM: CPT

## 2022-05-12 PROCEDURE — 81001 URINALYSIS AUTO W/SCOPE: CPT

## 2022-05-14 ENCOUNTER — HOSPITAL ENCOUNTER (EMERGENCY)
Dept: HOSPITAL 56 - MW.ED | Age: 47
Discharge: HOME | End: 2022-05-14
Payer: COMMERCIAL

## 2022-05-14 DIAGNOSIS — Z79.82: ICD-10-CM

## 2022-05-14 DIAGNOSIS — Z90.49: ICD-10-CM

## 2022-05-14 DIAGNOSIS — E78.00: ICD-10-CM

## 2022-05-14 DIAGNOSIS — Z91.040: ICD-10-CM

## 2022-05-14 DIAGNOSIS — I25.2: ICD-10-CM

## 2022-05-14 DIAGNOSIS — Z91.030: ICD-10-CM

## 2022-05-14 DIAGNOSIS — Z79.899: ICD-10-CM

## 2022-05-14 DIAGNOSIS — Z91.018: ICD-10-CM

## 2022-05-14 DIAGNOSIS — Z86.16: ICD-10-CM

## 2022-05-14 DIAGNOSIS — R10.10: Primary | ICD-10-CM

## 2022-05-14 LAB
BUN SERPL-MCNC: 14 MG/DL (ref 7–18)
CHLORIDE SERPL-SCNC: 101 MMOL/L (ref 98–107)
CO2 SERPL-SCNC: 25.3 MMOL/L (ref 21–32)
GLUCOSE SERPL-MCNC: 106 MG/DL (ref 74–106)
LIPASE SERPL-CCNC: 86 U/L (ref 73–393)
POTASSIUM SERPL-SCNC: 3.9 MMOL/L (ref 3.5–5.1)
SODIUM SERPL-SCNC: 138 MMOL/L (ref 136–148)

## 2022-09-02 ENCOUNTER — HOSPITAL ENCOUNTER (EMERGENCY)
Dept: HOSPITAL 56 - MW.ED | Age: 47
Discharge: NURSING, REHAB OR LONG-TERM CARE | End: 2022-09-02
Payer: COMMERCIAL

## 2022-09-02 DIAGNOSIS — I21.4: Primary | ICD-10-CM

## 2022-09-02 DIAGNOSIS — Z91.018: ICD-10-CM

## 2022-09-02 DIAGNOSIS — E78.00: ICD-10-CM

## 2022-09-02 DIAGNOSIS — Z91.040: ICD-10-CM

## 2022-09-02 DIAGNOSIS — Z20.822: ICD-10-CM

## 2022-09-02 DIAGNOSIS — Z79.899: ICD-10-CM

## 2022-09-02 LAB
BUN SERPL-MCNC: 17 MG/DL (ref 7–18)
CHLORIDE SERPL-SCNC: 101 MMOL/L (ref 98–107)
CO2 SERPL-SCNC: 26.7 MMOL/L (ref 21–32)
EGFRCR SERPLBLD CKD-EPI 2021: 105 ML/MIN (ref 60–?)
GLUCOSE SERPL-MCNC: 111 MG/DL (ref 74–106)
LIPASE SERPL-CCNC: 126 U/L (ref 73–393)
POTASSIUM SERPL-SCNC: 4.9 MMOL/L (ref 3.5–5.1)
SODIUM SERPL-SCNC: 134 MMOL/L (ref 136–148)

## 2022-09-02 PROCEDURE — 80053 COMPREHEN METABOLIC PANEL: CPT

## 2022-09-02 PROCEDURE — 84484 ASSAY OF TROPONIN QUANT: CPT

## 2022-09-02 PROCEDURE — 96368 THER/DIAG CONCURRENT INF: CPT

## 2022-09-02 PROCEDURE — 96365 THER/PROPH/DIAG IV INF INIT: CPT

## 2022-09-02 PROCEDURE — 96366 THER/PROPH/DIAG IV INF ADDON: CPT

## 2022-09-02 PROCEDURE — 36415 COLL VENOUS BLD VENIPUNCTURE: CPT

## 2022-09-02 PROCEDURE — 83690 ASSAY OF LIPASE: CPT

## 2022-09-02 PROCEDURE — 93005 ELECTROCARDIOGRAM TRACING: CPT

## 2022-09-02 PROCEDURE — 81003 URINALYSIS AUTO W/O SCOPE: CPT

## 2022-09-02 PROCEDURE — 87635 SARS-COV-2 COVID-19 AMP PRB: CPT

## 2022-09-02 PROCEDURE — 99285 EMERGENCY DEPT VISIT HI MDM: CPT

## 2022-09-02 PROCEDURE — 96376 TX/PRO/DX INJ SAME DRUG ADON: CPT

## 2022-09-02 PROCEDURE — 96361 HYDRATE IV INFUSION ADD-ON: CPT

## 2022-09-02 PROCEDURE — 85025 COMPLETE CBC W/AUTO DIFF WBC: CPT

## 2022-09-02 PROCEDURE — 96375 TX/PRO/DX INJ NEW DRUG ADDON: CPT

## 2022-09-02 PROCEDURE — 85730 THROMBOPLASTIN TIME PARTIAL: CPT

## 2022-09-02 PROCEDURE — U0002 COVID-19 LAB TEST NON-CDC: HCPCS

## 2022-09-02 RX ADMIN — NITROGLYCERIN PRN MG: 0.4 TABLET SUBLINGUAL at 10:14

## 2022-09-02 RX ADMIN — NITROGLYCERIN PRN MG: 0.4 TABLET SUBLINGUAL at 10:21

## 2022-09-02 RX ADMIN — NITROGLYCERIN PRN MG: 0.4 TABLET SUBLINGUAL at 10:05

## 2022-09-30 ENCOUNTER — HOSPITAL ENCOUNTER (EMERGENCY)
Dept: HOSPITAL 56 - MW.ED | Age: 47
Discharge: NURSING, REHAB OR LONG-TERM CARE | End: 2022-09-30
Payer: COMMERCIAL

## 2022-09-30 DIAGNOSIS — Z88.8: ICD-10-CM

## 2022-09-30 DIAGNOSIS — Z95.1: ICD-10-CM

## 2022-09-30 DIAGNOSIS — Z79.899: ICD-10-CM

## 2022-09-30 DIAGNOSIS — E78.00: ICD-10-CM

## 2022-09-30 DIAGNOSIS — Z91.018: ICD-10-CM

## 2022-09-30 DIAGNOSIS — I21.4: Primary | ICD-10-CM

## 2022-09-30 DIAGNOSIS — I25.2: ICD-10-CM

## 2022-09-30 DIAGNOSIS — Z91.040: ICD-10-CM

## 2022-09-30 DIAGNOSIS — Z20.822: ICD-10-CM

## 2022-09-30 DIAGNOSIS — Z86.16: ICD-10-CM

## 2022-09-30 DIAGNOSIS — Z79.82: ICD-10-CM

## 2022-09-30 LAB
BUN SERPL-MCNC: 16 MG/DL (ref 7–18)
CHLORIDE SERPL-SCNC: 102 MMOL/L (ref 98–107)
CO2 SERPL-SCNC: 26.4 MMOL/L (ref 21–32)
EGFRCR SERPLBLD CKD-EPI 2021: 92 ML/MIN (ref 60–?)
GLUCOSE SERPL-MCNC: 114 MG/DL (ref 74–106)
POTASSIUM SERPL-SCNC: 3.8 MMOL/L (ref 3.5–5.1)
SODIUM SERPL-SCNC: 139 MMOL/L (ref 136–148)

## 2022-09-30 PROCEDURE — 87635 SARS-COV-2 COVID-19 AMP PRB: CPT

## 2022-09-30 PROCEDURE — U0002 COVID-19 LAB TEST NON-CDC: HCPCS

## 2022-09-30 PROCEDURE — 85025 COMPLETE CBC W/AUTO DIFF WBC: CPT

## 2022-09-30 PROCEDURE — 85610 PROTHROMBIN TIME: CPT

## 2022-09-30 PROCEDURE — 85379 FIBRIN DEGRADATION QUANT: CPT

## 2022-09-30 PROCEDURE — 96375 TX/PRO/DX INJ NEW DRUG ADDON: CPT

## 2022-09-30 PROCEDURE — 85730 THROMBOPLASTIN TIME PARTIAL: CPT

## 2022-09-30 PROCEDURE — 80053 COMPREHEN METABOLIC PANEL: CPT

## 2022-09-30 PROCEDURE — 93005 ELECTROCARDIOGRAM TRACING: CPT

## 2022-09-30 PROCEDURE — 36415 COLL VENOUS BLD VENIPUNCTURE: CPT

## 2022-09-30 PROCEDURE — 96376 TX/PRO/DX INJ SAME DRUG ADON: CPT

## 2022-09-30 PROCEDURE — 84484 ASSAY OF TROPONIN QUANT: CPT

## 2022-09-30 PROCEDURE — 99285 EMERGENCY DEPT VISIT HI MDM: CPT

## 2022-09-30 PROCEDURE — 96365 THER/PROPH/DIAG IV INF INIT: CPT

## 2022-09-30 PROCEDURE — 71045 X-RAY EXAM CHEST 1 VIEW: CPT

## 2023-01-30 ENCOUNTER — HOSPITAL ENCOUNTER (EMERGENCY)
Dept: HOSPITAL 56 - MW.ED | Age: 48
LOS: 1 days | Discharge: HOME | End: 2023-01-31
Payer: MEDICAID

## 2023-01-30 DIAGNOSIS — Z91.040: ICD-10-CM

## 2023-01-30 DIAGNOSIS — E78.00: ICD-10-CM

## 2023-01-30 DIAGNOSIS — I25.2: ICD-10-CM

## 2023-01-30 DIAGNOSIS — Z91.018: ICD-10-CM

## 2023-01-30 DIAGNOSIS — Z79.82: ICD-10-CM

## 2023-01-30 DIAGNOSIS — Z86.16: ICD-10-CM

## 2023-01-30 DIAGNOSIS — Z79.899: ICD-10-CM

## 2023-01-30 DIAGNOSIS — I25.10: ICD-10-CM

## 2023-01-30 DIAGNOSIS — Z79.02: ICD-10-CM

## 2023-01-30 DIAGNOSIS — Z95.1: ICD-10-CM

## 2023-01-30 DIAGNOSIS — R10.9: Primary | ICD-10-CM

## 2023-01-31 LAB
BUN SERPL-MCNC: 16 MG/DL (ref 7–18)
CHLORIDE SERPL-SCNC: 105 MMOL/L (ref 98–107)
CO2 SERPL-SCNC: 23.9 MMOL/L (ref 21–32)
EGFRCR SERPLBLD CKD-EPI 2021: 67 ML/MIN (ref 60–?)
GLUCOSE SERPL-MCNC: 98 MG/DL (ref 74–106)
LIPASE SERPL-CCNC: 102 U/L (ref 73–393)
POTASSIUM SERPL-SCNC: 4.1 MMOL/L (ref 3.5–5.1)
SODIUM SERPL-SCNC: 142 MMOL/L (ref 136–148)

## 2023-03-18 ENCOUNTER — HOSPITAL ENCOUNTER (EMERGENCY)
Dept: HOSPITAL 56 - MW.ED | Age: 48
Discharge: HOME | End: 2023-03-18
Payer: COMMERCIAL

## 2023-03-18 DIAGNOSIS — Z79.82: ICD-10-CM

## 2023-03-18 DIAGNOSIS — Z86.16: ICD-10-CM

## 2023-03-18 DIAGNOSIS — E78.00: ICD-10-CM

## 2023-03-18 DIAGNOSIS — I25.2: ICD-10-CM

## 2023-03-18 DIAGNOSIS — Z91.040: ICD-10-CM

## 2023-03-18 DIAGNOSIS — Z79.899: ICD-10-CM
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
## 2023-03-18 DIAGNOSIS — Z91.018: ICD-10-CM

## 2023-03-18 DIAGNOSIS — S39.92XA: Primary | ICD-10-CM

## 2023-03-18 PROCEDURE — 99283 EMERGENCY DEPT VISIT LOW MDM: CPT

## 2023-03-18 PROCEDURE — 96374 THER/PROPH/DIAG INJ IV PUSH: CPT

## 2023-03-18 PROCEDURE — 96375 TX/PRO/DX INJ NEW DRUG ADDON: CPT

## 2023-03-18 PROCEDURE — 72131 CT LUMBAR SPINE W/O DYE: CPT

## 2023-06-22 ENCOUNTER — HOSPITAL ENCOUNTER (EMERGENCY)
Dept: HOSPITAL 56 - MW.ED | Age: 48
Discharge: NURSING, REHAB OR LONG-TERM CARE | End: 2023-06-22
Payer: COMMERCIAL

## 2023-06-22 DIAGNOSIS — E78.00: ICD-10-CM

## 2023-06-22 DIAGNOSIS — Z79.899: ICD-10-CM

## 2023-06-22 DIAGNOSIS — Z91.018: ICD-10-CM

## 2023-06-22 DIAGNOSIS — R10.13: Primary | ICD-10-CM

## 2023-06-22 DIAGNOSIS — I21.4: ICD-10-CM

## 2023-06-22 DIAGNOSIS — Z91.040: ICD-10-CM

## 2023-06-22 DIAGNOSIS — I25.2: ICD-10-CM

## 2023-06-22 DIAGNOSIS — Z79.82: ICD-10-CM

## 2023-06-22 DIAGNOSIS — Z86.16: ICD-10-CM

## 2023-06-22 LAB
ALBUMIN SERPL-MCNC: 4.1 G/DL (ref 3.4–5)
ALBUMIN/GLOB SERPL: 1.1 {RATIO} (ref 0.9–1.6)
ALP SERPL-CCNC: 65 U/L (ref 46–116)
ALT SERPL-CCNC: 43 IU/L (ref 14–63)
APPEARANCE UR: CLEAR
AST SERPL-CCNC: 25 IU/L (ref 15–37)
BASOPHILS # BLD AUTO: 0 K/UL (ref 0–0.1)
BASOPHILS NFR BLD AUTO: 0.3 % (ref 0–1.5)
BILIRUB SERPL-MCNC: 0.5 MG/DL (ref 0.2–1)
BILIRUB UR STRIP-MCNC: NEGATIVE MG/DL
BUN SERPL-MCNC: 20 MG/DL (ref 7–18)
CALCIUM SERPL-MCNC: 9 MG/DL (ref 8.5–10.1)
CHLORIDE SERPL-SCNC: 99 MMOL/L (ref 98–107)
CO2 SERPL-SCNC: 26.2 MMOL/L (ref 21–32)
COLOR UR: YELLOW
CREAT CL 24H UR+SERPL-VRATE: 66.46 ML/MIN
CREAT SERPL-MCNC: 1.2 MG/DL (ref 0.8–1.3)
EGFRCR SERPLBLD CKD-EPI 2021: 74 ML/MIN (ref 60–?)
EOSINOPHIL # BLD AUTO: 0.2 K/UL (ref 0–0.7)
EOSINOPHIL NFR BLD AUTO: 3.8 % (ref 0–7)
GLOBULIN SER-MCNC: 3.7 G/DL (ref 2.6–4)
GLUCOSE SERPL-MCNC: 108 MG/DL (ref 74–106)
GLUCOSE UR STRIP-MCNC: NEGATIVE MG/DL
HCT VFR BLD AUTO: 42.6 % (ref 38–50)
HGB BLD-MCNC: 14.7 G/DL (ref 13–17)
KETONES UR STRIP-MCNC: NEGATIVE MG/DL
LIPASE SERPL-CCNC: 112 U/L (ref 73–393)
LYMPHOCYTES # BLD AUTO: 1.6 K/UL (ref 0.6–2.4)
LYMPHOCYTES NFR BLD AUTO: 27 % (ref 16–40)
MCH RBC QN AUTO: 30.9 PG (ref 27–32)
MCHC RBC AUTO-ENTMCNC: 34.5 G/DL (ref 31–37)
MCHC RBC AUTO-ENTMCNC: 89.7 FL (ref 80–98)
MONOCYTES # BLD AUTO: 0.3 K/UL (ref 0–0.8)
MONOCYTES NFR BLD AUTO: 5.7 % (ref 0–15)
NEUTROPHILS # BLD AUTO: 3.7 K/UL (ref 1.4–5.7)
NEUTROPHILS NFR BLD AUTO: 63.2 % (ref 48–80)
NITRITE UR QL: NEGATIVE
NRBC BLD AUTO-RTO: 0 /100WBC
NRBC BLD AUTO-RTO: 0 K/UL
PH UR STRIP: 6 [PH] (ref 5–8)
PLATELET # BLD AUTO: 196 K/UL (ref 150–400)
PMV BLD AUTO: 9.6 FL (ref 7.4–12)
POTASSIUM SERPL-SCNC: 4.1 MMOL/L (ref 3.5–5.1)
PROT SERPL-MCNC: 7.8 G/DL (ref 6.4–8.2)
PROT UR STRIP-MCNC: NEGATIVE MG/DL
RBC # BLD AUTO: 4.75 M/UL (ref 4.5–5.9)
RBC UR QL: NEGATIVE
SODIUM SERPL-SCNC: 137 MMOL/L (ref 136–148)
SP GR UR STRIP: >= 1.03 (ref 1–1.03)
UROBILINOGEN UR STRIP-ACNC: 0.2 EU/DL (ref ?–2)
WBC # BLD AUTO: 5.81 K/UL (ref 4–11)

## 2023-06-22 PROCEDURE — 83690 ASSAY OF LIPASE: CPT

## 2023-06-22 PROCEDURE — 36415 COLL VENOUS BLD VENIPUNCTURE: CPT

## 2023-06-22 PROCEDURE — 96376 TX/PRO/DX INJ SAME DRUG ADON: CPT

## 2023-06-22 PROCEDURE — 84484 ASSAY OF TROPONIN QUANT: CPT

## 2023-06-22 PROCEDURE — 81003 URINALYSIS AUTO W/O SCOPE: CPT

## 2023-06-22 PROCEDURE — 96365 THER/PROPH/DIAG IV INF INIT: CPT

## 2023-06-22 PROCEDURE — 96375 TX/PRO/DX INJ NEW DRUG ADDON: CPT

## 2023-06-22 PROCEDURE — 99285 EMERGENCY DEPT VISIT HI MDM: CPT

## 2023-06-22 PROCEDURE — 80053 COMPREHEN METABOLIC PANEL: CPT

## 2023-06-22 PROCEDURE — 85730 THROMBOPLASTIN TIME PARTIAL: CPT

## 2023-06-22 PROCEDURE — 93005 ELECTROCARDIOGRAM TRACING: CPT

## 2023-06-22 PROCEDURE — 85025 COMPLETE CBC W/AUTO DIFF WBC: CPT

## 2023-08-20 ENCOUNTER — HOSPITAL ENCOUNTER (EMERGENCY)
Dept: HOSPITAL 56 - MW.ED | Age: 48
Discharge: HOME | End: 2023-08-20
Payer: COMMERCIAL

## 2023-08-20 DIAGNOSIS — K86.1: Primary | ICD-10-CM

## 2023-08-20 DIAGNOSIS — E78.00: ICD-10-CM

## 2023-08-20 DIAGNOSIS — Z79.82: ICD-10-CM

## 2023-08-20 DIAGNOSIS — I25.10: ICD-10-CM

## 2023-08-20 DIAGNOSIS — Z79.899: ICD-10-CM

## 2023-08-20 DIAGNOSIS — Z91.018: ICD-10-CM

## 2023-08-20 DIAGNOSIS — Z86.16: ICD-10-CM

## 2023-08-20 DIAGNOSIS — Z95.1: ICD-10-CM

## 2023-08-20 DIAGNOSIS — I25.2: ICD-10-CM

## 2023-08-20 DIAGNOSIS — Z79.02: ICD-10-CM

## 2023-08-20 DIAGNOSIS — Z91.040: ICD-10-CM

## 2023-08-20 LAB
ALBUMIN SERPL-MCNC: 3.5 G/DL (ref 3.4–5)
ALBUMIN/GLOB SERPL: 0.9 {RATIO} (ref 0.9–1.6)
ALP SERPL-CCNC: 60 U/L (ref 46–116)
ALT SERPL-CCNC: 43 IU/L (ref 14–63)
APPEARANCE UR: CLEAR
AST SERPL-CCNC: 41 IU/L (ref 15–37)
BACTERIA URNS QL MICRO: (no result)
BASOPHILS # BLD AUTO: 0 K/UL (ref 0–0.1)
BASOPHILS NFR BLD AUTO: 0.4 % (ref 0–1.5)
BILIRUB SERPL-MCNC: 0.6 MG/DL (ref 0.2–1)
BILIRUB UR STRIP-MCNC: NEGATIVE MG/DL
BUN SERPL-MCNC: 15 MG/DL (ref 7–18)
CALCIUM SERPL-MCNC: 8 MG/DL (ref 8.5–10.1)
CHLORIDE SERPL-SCNC: 102 MMOL/L (ref 98–107)
CO2 SERPL-SCNC: 23.9 MMOL/L (ref 21–32)
COLOR UR: YELLOW
CREAT CL 24H UR+SERPL-VRATE: 88.61 ML/MIN
CREAT SERPL-MCNC: 0.9 MG/DL (ref 0.8–1.3)
EGFRCR SERPLBLD CKD-EPI 2021: 104 ML/MIN (ref 60–?)
EOSINOPHIL # BLD AUTO: 0.3 K/UL (ref 0–0.7)
EOSINOPHIL NFR BLD AUTO: 5.2 % (ref 0–7)
EPI CELLS #/AREA URNS HPF: (no result) /[HPF]
GLOBULIN SER-MCNC: 3.7 G/DL (ref 2.6–4)
GLUCOSE SERPL-MCNC: 130 MG/DL (ref 74–106)
GLUCOSE UR STRIP-MCNC: NEGATIVE MG/DL
HCT VFR BLD AUTO: 39.4 % (ref 38–50)
HGB BLD-MCNC: 14.7 G/DL (ref 13–17)
KETONES UR STRIP-MCNC: NEGATIVE MG/DL
LACTATE SERPL-SCNC: 0.6 MMOL/L (ref 0.4–2)
LIPASE SERPL-CCNC: 37 U/L (ref 16–77)
LYMPHOCYTES # BLD AUTO: 1.2 K/UL (ref 0.6–2.4)
LYMPHOCYTES NFR BLD AUTO: 24.5 % (ref 16–40)
MCH RBC QN AUTO: 33.7 PG (ref 27–32)
MCHC RBC AUTO-ENTMCNC: 37.3 G/DL (ref 31–37)
MCHC RBC AUTO-ENTMCNC: 90.4 FL (ref 80–98)
MONOCYTES # BLD AUTO: 0.6 K/UL (ref 0–0.8)
MONOCYTES NFR BLD AUTO: 11.8 % (ref 0–15)
MUCOUS THREADS URNS QL MICRO: (no result)
NEUTROPHILS # BLD AUTO: 2.8 K/UL (ref 1.4–5.7)
NEUTROPHILS NFR BLD AUTO: 58.1 % (ref 48–80)
NITRITE UR QL: NEGATIVE
NRBC BLD AUTO-RTO: 0 /100WBC
NRBC BLD AUTO-RTO: 0 K/UL
PH UR STRIP: 6 [PH] (ref 5–8)
PLATELET # BLD AUTO: 198 K/UL (ref 150–400)
PMV BLD AUTO: 10.2 FL (ref 7.4–12)
POTASSIUM SERPL-SCNC: 4.7 MMOL/L (ref 3.5–5.1)
PROT SERPL-MCNC: 7.2 G/DL (ref 6.4–8.2)
PROT UR STRIP-MCNC: NEGATIVE MG/DL
RBC # BLD AUTO: 4.36 M/UL (ref 4.5–5.9)
RBC # URNS HPF: (no result) /ML
RBC UR QL: (no result)
SODIUM SERPL-SCNC: 138 MMOL/L (ref 136–148)
SP GR UR STRIP: 1.02 (ref 1–1.03)
UROBILINOGEN UR STRIP-ACNC: 0.2 EU/DL (ref ?–2)
WBC # BLD AUTO: 4.82 K/UL (ref 4–11)
WBC UR QL: (no result)

## 2023-08-20 PROCEDURE — 99284 EMERGENCY DEPT VISIT MOD MDM: CPT

## 2023-08-20 PROCEDURE — 80053 COMPREHEN METABOLIC PANEL: CPT

## 2023-08-20 PROCEDURE — 96374 THER/PROPH/DIAG INJ IV PUSH: CPT

## 2023-08-20 PROCEDURE — 81001 URINALYSIS AUTO W/SCOPE: CPT

## 2023-08-20 PROCEDURE — 96375 TX/PRO/DX INJ NEW DRUG ADDON: CPT

## 2023-08-20 PROCEDURE — 84484 ASSAY OF TROPONIN QUANT: CPT

## 2023-08-20 PROCEDURE — 83605 ASSAY OF LACTIC ACID: CPT

## 2023-08-20 PROCEDURE — 83690 ASSAY OF LIPASE: CPT

## 2023-08-20 PROCEDURE — 85025 COMPLETE CBC W/AUTO DIFF WBC: CPT

## 2023-08-20 PROCEDURE — 96361 HYDRATE IV INFUSION ADD-ON: CPT

## 2023-08-20 PROCEDURE — 93005 ELECTROCARDIOGRAM TRACING: CPT

## 2023-08-20 PROCEDURE — 36415 COLL VENOUS BLD VENIPUNCTURE: CPT

## 2023-09-04 ENCOUNTER — HOSPITAL ENCOUNTER (EMERGENCY)
Dept: HOSPITAL 56 - MW.ED | Age: 48
Discharge: HOME | End: 2023-09-04
Payer: COMMERCIAL

## 2023-09-04 DIAGNOSIS — Z95.1: ICD-10-CM

## 2023-09-04 DIAGNOSIS — J32.9: Primary | ICD-10-CM

## 2023-09-04 DIAGNOSIS — Z91.018: ICD-10-CM

## 2023-09-04 DIAGNOSIS — Z86.16: ICD-10-CM

## 2023-09-04 DIAGNOSIS — E78.5: ICD-10-CM

## 2023-09-04 DIAGNOSIS — Z20.822: ICD-10-CM

## 2023-09-04 DIAGNOSIS — I25.2: ICD-10-CM

## 2023-09-04 DIAGNOSIS — Z91.040: ICD-10-CM

## 2023-09-04 DIAGNOSIS — Z79.82: ICD-10-CM

## 2023-09-04 DIAGNOSIS — Z79.899: ICD-10-CM

## 2023-09-04 LAB
FLUAV RNA UPPER RESP QL NAA+PROBE: NEGATIVE
FLUBV RNA UPPER RESP QL NAA+PROBE: NEGATIVE
SARS-COV-2 RNA RESP QL NAA+PROBE: NEGATIVE

## 2023-09-04 PROCEDURE — 71046 X-RAY EXAM CHEST 2 VIEWS: CPT

## 2023-09-04 PROCEDURE — 99283 EMERGENCY DEPT VISIT LOW MDM: CPT

## 2023-09-04 PROCEDURE — 0240U: CPT

## 2023-10-04 ENCOUNTER — HOSPITAL ENCOUNTER (EMERGENCY)
Dept: HOSPITAL 56 - MW.ED | Age: 48
Discharge: HOME | End: 2023-10-04
Payer: COMMERCIAL

## 2023-10-04 DIAGNOSIS — Z91.040: ICD-10-CM

## 2023-10-04 DIAGNOSIS — R10.10: Primary | ICD-10-CM

## 2023-10-04 DIAGNOSIS — Z79.82: ICD-10-CM

## 2023-10-04 DIAGNOSIS — E78.00: ICD-10-CM

## 2023-10-04 DIAGNOSIS — Z79.899: ICD-10-CM

## 2023-10-04 DIAGNOSIS — Z86.16: ICD-10-CM

## 2023-10-04 DIAGNOSIS — Z91.018: ICD-10-CM

## 2023-10-04 DIAGNOSIS — I25.2: ICD-10-CM

## 2023-10-04 LAB
ALBUMIN SERPL-MCNC: 3.6 G/DL (ref 3.4–5)
ALBUMIN/GLOB SERPL: 0.9 {RATIO} (ref 0.9–1.6)
ALP SERPL-CCNC: 74 U/L (ref 46–116)
ALT SERPL-CCNC: 25 IU/L (ref 14–63)
AST SERPL-CCNC: 26 IU/L (ref 15–37)
BASOPHILS # BLD AUTO: 0 K/UL (ref 0–0.1)
BASOPHILS NFR BLD AUTO: 0.4 % (ref 0–1.5)
BILIRUB SERPL-MCNC: 0.8 MG/DL (ref 0.2–1)
BUN SERPL-MCNC: 16 MG/DL (ref 7–18)
CALCIUM SERPL-MCNC: 8.6 MG/DL (ref 8.5–10.1)
CHLORIDE SERPL-SCNC: 102 MMOL/L (ref 98–107)
CO2 SERPL-SCNC: 24.4 MMOL/L (ref 21–32)
CREAT CL 24H UR+SERPL-VRATE: 66.46 ML/MIN
CREAT SERPL-MCNC: 1.2 MG/DL (ref 0.8–1.3)
EGFRCR SERPLBLD CKD-EPI 2021: 74 ML/MIN (ref 60–?)
EOSINOPHIL # BLD AUTO: 0.2 K/UL (ref 0–0.7)
EOSINOPHIL NFR BLD AUTO: 4.2 % (ref 0–7)
GLOBULIN SER-MCNC: 4 G/DL (ref 2.6–4)
GLUCOSE SERPL-MCNC: 153 MG/DL (ref 74–106)
HCT VFR BLD AUTO: 41.2 % (ref 38–50)
HGB BLD-MCNC: 15.4 G/DL (ref 13–17)
INR PPP: 0.96 (ref 0.86–1.11)
LIPASE SERPL-CCNC: 54 U/L (ref 16–77)
LYMPHOCYTES # BLD AUTO: 1.3 K/UL (ref 0.6–2.4)
LYMPHOCYTES NFR BLD AUTO: 26.5 % (ref 16–40)
MCH RBC QN AUTO: 33.6 PG (ref 27–32)
MCHC RBC AUTO-ENTMCNC: 37.4 G/DL (ref 31–37)
MCHC RBC AUTO-ENTMCNC: 89.8 FL (ref 80–98)
MONOCYTES # BLD AUTO: 0.8 K/UL (ref 0–0.8)
MONOCYTES NFR BLD AUTO: 16.4 % (ref 0–15)
NEUTROPHILS # BLD AUTO: 2.7 K/UL (ref 1.4–5.7)
NEUTROPHILS NFR BLD AUTO: 52.5 % (ref 48–80)
PLATELET # BLD AUTO: 183 K/UL (ref 150–400)
PMV BLD AUTO: 10.2 FL (ref 7.4–12)
POTASSIUM SERPL-SCNC: 4 MMOL/L (ref 3.5–5.1)
PROT SERPL-MCNC: 7.6 G/DL (ref 6.4–8.2)
RBC # BLD AUTO: 4.59 M/UL (ref 4.5–5.9)
SODIUM SERPL-SCNC: 138 MMOL/L (ref 136–148)
WBC # BLD AUTO: 5.05 K/UL (ref 4–11)

## 2023-10-04 PROCEDURE — 85610 PROTHROMBIN TIME: CPT

## 2023-10-04 PROCEDURE — 96374 THER/PROPH/DIAG INJ IV PUSH: CPT

## 2023-10-04 PROCEDURE — 93005 ELECTROCARDIOGRAM TRACING: CPT

## 2023-10-04 PROCEDURE — 83690 ASSAY OF LIPASE: CPT

## 2023-10-04 PROCEDURE — 84484 ASSAY OF TROPONIN QUANT: CPT

## 2023-10-04 PROCEDURE — 85025 COMPLETE CBC W/AUTO DIFF WBC: CPT

## 2023-10-04 PROCEDURE — 74177 CT ABD & PELVIS W/CONTRAST: CPT

## 2023-10-04 PROCEDURE — 96361 HYDRATE IV INFUSION ADD-ON: CPT

## 2023-10-04 PROCEDURE — 96376 TX/PRO/DX INJ SAME DRUG ADON: CPT

## 2023-10-04 PROCEDURE — 96375 TX/PRO/DX INJ NEW DRUG ADDON: CPT

## 2023-10-04 PROCEDURE — 80053 COMPREHEN METABOLIC PANEL: CPT

## 2023-10-04 PROCEDURE — 36415 COLL VENOUS BLD VENIPUNCTURE: CPT

## 2023-10-04 PROCEDURE — 99284 EMERGENCY DEPT VISIT MOD MDM: CPT
